# Patient Record
Sex: FEMALE | Race: OTHER | HISPANIC OR LATINO | URBAN - METROPOLITAN AREA
[De-identification: names, ages, dates, MRNs, and addresses within clinical notes are randomized per-mention and may not be internally consistent; named-entity substitution may affect disease eponyms.]

---

## 2018-02-26 ENCOUNTER — EMERGENCY (EMERGENCY)
Facility: HOSPITAL | Age: 73
LOS: 1 days | Discharge: HOME | End: 2018-02-26
Attending: EMERGENCY MEDICINE
Payer: MEDICARE

## 2018-02-26 VITALS
DIASTOLIC BLOOD PRESSURE: 59 MMHG | WEIGHT: 115.96 LBS | OXYGEN SATURATION: 100 % | HEART RATE: 80 BPM | SYSTOLIC BLOOD PRESSURE: 132 MMHG | HEIGHT: 62 IN | RESPIRATION RATE: 15 BRPM | TEMPERATURE: 98 F

## 2018-02-26 VITALS
HEART RATE: 80 BPM | RESPIRATION RATE: 18 BRPM | TEMPERATURE: 98 F | SYSTOLIC BLOOD PRESSURE: 119 MMHG | OXYGEN SATURATION: 97 % | DIASTOLIC BLOOD PRESSURE: 69 MMHG

## 2018-02-26 PROBLEM — Z00.00 ENCOUNTER FOR PREVENTIVE HEALTH EXAMINATION: Status: ACTIVE | Noted: 2018-02-26

## 2018-02-26 LAB
ALBUMIN SERPL ELPH-MCNC: 4.2 G/DL — SIGNIFICANT CHANGE UP (ref 3.3–5.2)
ALP SERPL-CCNC: 120 U/L — SIGNIFICANT CHANGE UP (ref 40–120)
ALT FLD-CCNC: 25 U/L — SIGNIFICANT CHANGE UP
ANION GAP SERPL CALC-SCNC: 17 MMOL/L — SIGNIFICANT CHANGE UP (ref 5–17)
APPEARANCE UR: CLEAR — SIGNIFICANT CHANGE UP
AST SERPL-CCNC: 23 U/L — SIGNIFICANT CHANGE UP
BASOPHILS # BLD AUTO: 0 K/UL — SIGNIFICANT CHANGE UP (ref 0–0.2)
BASOPHILS NFR BLD AUTO: 0.5 % — SIGNIFICANT CHANGE UP (ref 0–2)
BILIRUB SERPL-MCNC: 0.3 MG/DL — LOW (ref 0.4–2)
BILIRUB UR-MCNC: NEGATIVE — SIGNIFICANT CHANGE UP
BUN SERPL-MCNC: 13 MG/DL — SIGNIFICANT CHANGE UP (ref 8–20)
CALCIUM SERPL-MCNC: 9.2 MG/DL — SIGNIFICANT CHANGE UP (ref 8.6–10.2)
CHLORIDE SERPL-SCNC: 101 MMOL/L — SIGNIFICANT CHANGE UP (ref 98–107)
CO2 SERPL-SCNC: 21 MMOL/L — LOW (ref 22–29)
COLOR SPEC: YELLOW — SIGNIFICANT CHANGE UP
CREAT SERPL-MCNC: 0.86 MG/DL — SIGNIFICANT CHANGE UP (ref 0.5–1.3)
DIFF PNL FLD: NEGATIVE — SIGNIFICANT CHANGE UP
EOSINOPHIL # BLD AUTO: 0.1 K/UL — SIGNIFICANT CHANGE UP (ref 0–0.5)
EOSINOPHIL NFR BLD AUTO: 1.3 % — SIGNIFICANT CHANGE UP (ref 0–6)
GLUCOSE SERPL-MCNC: 107 MG/DL — SIGNIFICANT CHANGE UP (ref 70–115)
GLUCOSE UR QL: NEGATIVE MG/DL — SIGNIFICANT CHANGE UP
HCT VFR BLD CALC: 41.6 % — SIGNIFICANT CHANGE UP (ref 37–47)
HGB BLD-MCNC: 14 G/DL — SIGNIFICANT CHANGE UP (ref 12–16)
KETONES UR-MCNC: NEGATIVE — SIGNIFICANT CHANGE UP
LEUKOCYTE ESTERASE UR-ACNC: NEGATIVE — SIGNIFICANT CHANGE UP
LYMPHOCYTES # BLD AUTO: 1.6 K/UL — SIGNIFICANT CHANGE UP (ref 1–4.8)
LYMPHOCYTES # BLD AUTO: 25.8 % — SIGNIFICANT CHANGE UP (ref 20–55)
MCHC RBC-ENTMCNC: 28.3 PG — SIGNIFICANT CHANGE UP (ref 27–31)
MCHC RBC-ENTMCNC: 33.7 G/DL — SIGNIFICANT CHANGE UP (ref 32–36)
MCV RBC AUTO: 84 FL — SIGNIFICANT CHANGE UP (ref 81–99)
MONOCYTES # BLD AUTO: 0.3 K/UL — SIGNIFICANT CHANGE UP (ref 0–0.8)
MONOCYTES NFR BLD AUTO: 5.7 % — SIGNIFICANT CHANGE UP (ref 3–10)
NEUTROPHILS # BLD AUTO: 4 K/UL — SIGNIFICANT CHANGE UP (ref 1.8–8)
NEUTROPHILS NFR BLD AUTO: 66.5 % — SIGNIFICANT CHANGE UP (ref 37–73)
NITRITE UR-MCNC: NEGATIVE — SIGNIFICANT CHANGE UP
PH UR: 6 — SIGNIFICANT CHANGE UP (ref 5–8)
PLATELET # BLD AUTO: 354 K/UL — SIGNIFICANT CHANGE UP (ref 150–400)
POTASSIUM SERPL-MCNC: 3.8 MMOL/L — SIGNIFICANT CHANGE UP (ref 3.5–5.3)
POTASSIUM SERPL-SCNC: 3.8 MMOL/L — SIGNIFICANT CHANGE UP (ref 3.5–5.3)
PROT SERPL-MCNC: 7.5 G/DL — SIGNIFICANT CHANGE UP (ref 6.6–8.7)
PROT UR-MCNC: NEGATIVE MG/DL — SIGNIFICANT CHANGE UP
RBC # BLD: 4.95 M/UL — SIGNIFICANT CHANGE UP (ref 4.4–5.2)
RBC # FLD: 14.1 % — SIGNIFICANT CHANGE UP (ref 11–15.6)
SODIUM SERPL-SCNC: 139 MMOL/L — SIGNIFICANT CHANGE UP (ref 135–145)
SP GR SPEC: 1 — LOW (ref 1.01–1.02)
TROPONIN T SERPL-MCNC: <0.01 NG/ML — SIGNIFICANT CHANGE UP (ref 0–0.06)
TSH SERPL-MCNC: 2.23 UIU/ML — SIGNIFICANT CHANGE UP (ref 0.27–4.2)
UROBILINOGEN FLD QL: NEGATIVE MG/DL — SIGNIFICANT CHANGE UP
WBC # BLD: 6 K/UL — SIGNIFICANT CHANGE UP (ref 4.8–10.8)
WBC # FLD AUTO: 6 K/UL — SIGNIFICANT CHANGE UP (ref 4.8–10.8)

## 2018-02-26 PROCEDURE — 93010 ELECTROCARDIOGRAM REPORT: CPT

## 2018-02-26 PROCEDURE — 81003 URINALYSIS AUTO W/O SCOPE: CPT

## 2018-02-26 PROCEDURE — 99284 EMERGENCY DEPT VISIT MOD MDM: CPT | Mod: 25

## 2018-02-26 PROCEDURE — 84443 ASSAY THYROID STIM HORMONE: CPT

## 2018-02-26 PROCEDURE — 71045 X-RAY EXAM CHEST 1 VIEW: CPT | Mod: 26

## 2018-02-26 PROCEDURE — 85027 COMPLETE CBC AUTOMATED: CPT

## 2018-02-26 PROCEDURE — 80053 COMPREHEN METABOLIC PANEL: CPT

## 2018-02-26 PROCEDURE — 99285 EMERGENCY DEPT VISIT HI MDM: CPT

## 2018-02-26 PROCEDURE — 84484 ASSAY OF TROPONIN QUANT: CPT

## 2018-02-26 PROCEDURE — 93005 ELECTROCARDIOGRAM TRACING: CPT

## 2018-02-26 PROCEDURE — 36415 COLL VENOUS BLD VENIPUNCTURE: CPT

## 2018-02-26 PROCEDURE — 71045 X-RAY EXAM CHEST 1 VIEW: CPT

## 2018-02-26 RX ORDER — ASPIRIN/CALCIUM CARB/MAGNESIUM 324 MG
325 TABLET ORAL ONCE
Qty: 0 | Refills: 0 | Status: COMPLETED | OUTPATIENT
Start: 2018-02-26 | End: 2018-02-26

## 2018-02-26 RX ORDER — MECLIZINE HCL 12.5 MG
1 TABLET ORAL
Qty: 21 | Refills: 0 | OUTPATIENT
Start: 2018-02-26 | End: 2018-03-04

## 2018-02-26 RX ORDER — SODIUM CHLORIDE 9 MG/ML
1000 INJECTION INTRAMUSCULAR; INTRAVENOUS; SUBCUTANEOUS ONCE
Qty: 0 | Refills: 0 | Status: DISCONTINUED | OUTPATIENT
Start: 2018-02-26 | End: 2018-03-02

## 2018-02-26 RX ORDER — ATORVASTATIN CALCIUM 80 MG/1
1 TABLET, FILM COATED ORAL
Qty: 0 | Refills: 0 | COMMUNITY

## 2018-02-26 RX ORDER — MECLIZINE HCL 12.5 MG
25 TABLET ORAL ONCE
Qty: 0 | Refills: 0 | Status: DISCONTINUED | OUTPATIENT
Start: 2018-02-26 | End: 2018-03-02

## 2018-02-26 NOTE — ED STATDOCS - PROGRESS NOTE DETAILS
73 y/o F w/ PMHx of HLD presents to ED c/o dizziness and fatigue. Associated sx include nausea, vomiting and intermittent CP with episodes lasting a few minutes. She reports CP resolved last week but has returned today around 07:30. Pt went to [facility] 02/01/2018 where she began experiencing urinary frequency and burning urination. She returned home and visited her PMD where she was given Dr. Pitts who noticed microscopic blood in urine and is treating her with Bactrim (started on 02/17/18). Pt tested negative for the flu but has sx. She had a dizzy spell yesterday which resulted in near syncope. Daily medications include Lipitor 10mg. Denies SOB or any other complaints at this time. Protocol orders have been entered following a focused evaluation in intake. Pt to be placed in the main ED for further evaluation by another provider. 71 y/o F w/ PMHx of HLD presents to ED c/o dizziness and fatigue. Associated sx include nausea, vomiting and intermittent CP with episodes lasting a few minutes. She reports CP epiosde last week and again today around 07:30. She had a dizzy spell yesterday and today assoc with ataxia. Pt went to [facility] 02/01/2018 where she began experiencing urinary frequency and burning urination. She returned home and visited her PMD where she was given Dr. Pitts who noticed microscopic blood in urine and is treating her with Bactrim (started on 02/17/18). Pt tested negative for the flu but has sx. Daily medications include Lipitor 10mg. Denies SOB or any other complaints at this time. Protocol orders have been entered following a focused evaluation in intake. Pt to be placed in the main ED for further evaluation by another provider.

## 2018-02-26 NOTE — ED ADULT NURSE REASSESSMENT NOTE - NS ED NURSE REASSESS COMMENT FT1
pt was in [facility] beginning of Feb 'I was sick, they gave me an ABT"  "and I came back and now I am still sick"   pt went to the MDs office and sent to have tests done.  'I need to have blood work and a sono of my kidneys and a chest xray"   labs sent pt ambulated to bathroom with cup

## 2019-07-11 ENCOUNTER — RESULT REVIEW (OUTPATIENT)
Age: 74
End: 2019-07-11

## 2019-10-08 PROBLEM — E78.00 PURE HYPERCHOLESTEROLEMIA, UNSPECIFIED: Chronic | Status: ACTIVE | Noted: 2018-02-26

## 2019-10-25 ENCOUNTER — EMERGENCY (EMERGENCY)
Facility: HOSPITAL | Age: 74
LOS: 1 days | Discharge: HOME | End: 2019-10-25
Attending: EMERGENCY MEDICINE
Payer: COMMERCIAL

## 2019-10-25 VITALS
WEIGHT: 110.01 LBS | DIASTOLIC BLOOD PRESSURE: 72 MMHG | RESPIRATION RATE: 16 BRPM | OXYGEN SATURATION: 100 % | SYSTOLIC BLOOD PRESSURE: 168 MMHG | HEART RATE: 70 BPM | TEMPERATURE: 98 F | HEIGHT: 62 IN

## 2019-10-25 PROCEDURE — 99284 EMERGENCY DEPT VISIT MOD MDM: CPT

## 2019-10-25 PROCEDURE — 71101 X-RAY EXAM UNILAT RIBS/CHEST: CPT

## 2019-10-25 PROCEDURE — 99283 EMERGENCY DEPT VISIT LOW MDM: CPT

## 2019-10-25 PROCEDURE — 71101 X-RAY EXAM UNILAT RIBS/CHEST: CPT | Mod: 26

## 2019-10-25 PROCEDURE — 73610 X-RAY EXAM OF ANKLE: CPT | Mod: 26,LT

## 2019-10-25 PROCEDURE — 73610 X-RAY EXAM OF ANKLE: CPT

## 2019-10-25 PROCEDURE — T1013: CPT

## 2019-10-25 NOTE — ED STATDOCS - NS ED ROS FT
Constitutional: no fevers, chills  HEENT: no visual changes, no sore throat, no rhinorrhea  CV: no cp  Resp: no sob  GI: no abd pain, n/v, diarrhea/constipation  : no dysuria, hematuria  MSK left sided rib and ankle pain  skin: no rashes  neuro: no HA, no confusion  psych: no SI/HI

## 2019-10-25 NOTE — ED STATDOCS - CLINICAL SUMMARY MEDICAL DECISION MAKING FREE TEXT BOX
Pt s/p mechanical fall, will obtain Xray and reassess. Has already taken pain medication. Pt s/p mechanical fall, will obtain Xray and reassess. Has already taken pain medication. Gave pt an ice pack

## 2019-10-25 NOTE — ED ADULT TRIAGE NOTE - CHIEF COMPLAINT QUOTE
Pt s/p trip and fall outside this morning while walking her grandson to the bus stop. Pt c/o pain to the left side of ribs, left leg and foot. +swelling and abrasion to left foot.,

## 2019-10-25 NOTE — ED STATDOCS - PATIENT PORTAL LINK FT
You can access the FollowMyHealth Patient Portal offered by [facility] by registering at the following website: [email or web address]. By joining [employer]’s FollowMyHealth portal, you will also be able to view your health information using other applications (apps) compatible with our system.

## 2019-10-25 NOTE — ED STATDOCS - PROGRESS NOTE DETAILS
73 year old female with PMHx arthritis and HLD presents to the ED s/p twisting L ankle this morning. HPI/ ROS/ PEx as stated above. X ray ankle and L ribs ordered. Will reassess. x ray ankle and rib neg for fx. Will dc with air cast, pt requesting cane instead of crutches. Will d/c with ortho f/u.

## 2019-10-25 NOTE — ED STATDOCS - OBJECTIVE STATEMENT
74y/o F with PMHx of Hyperlipidemia and Arthritis presents to the ED s/p mechanical trip and fall c/o left sided rib and ankle pain. Denies head trauma or LOC. Pt took Tylenol and applied ice to area PTA. Pt denies use of blood thinners. Denies CP, SOB, N/V. No additional complaints at this time.   : Kate

## 2019-10-25 NOTE — ED STATDOCS - PHYSICAL EXAMINATION
Gen: Well appearing in NAD  Head: NC/AT  Cardiac: Reg rate and rhythm   Resp: CTA   Neck: trachea midline  Resp:  No distress  Ext: TTP left medial malleolus and left mid food. Sensation and pulses intact. TTP left anterior lower ribs. No midline spinal/paraspinal tenderness. Full ROM of neck.   Neuro:  A&O appears non focal  Skin:  Warm and dry as visualized  Psych:  Normal affect and mood Gen: Well appearing in NAD  Head: NC/AT  Cardiac: Reg rate and rhythm   Resp: CTA   Neck: trachea midline  Resp:  No distress  Ext: TTP left medial malleolus and left mid foot. Sensation and pulses intact. TTP left anterior lower ribs. No midline spinal/paraspinal tenderness. Full ROM of neck.   Neuro:  A&O appears non focal  Skin:  Warm and dry as visualized  Psych:  Normal affect and mood Gen: Well appearing in NAD  Head: NC/AT  Cardiac: Reg rate and rhythm   Resp: CTA   Neck: trachea midline  Ext: TTP left medial malleolus and left mid foot. Sensation and pulses intact. TTP left anterior lower ribs. No midline spinal/paraspinal tenderness. Full ROM of neck.   Neuro:  A&O appears non focal  Skin:  Warm and dry as visualized  Psych:  Normal affect and mood

## 2019-11-16 ENCOUNTER — APPOINTMENT (OUTPATIENT)
Dept: OBGYN | Facility: CLINIC | Age: 74
End: 2019-11-16
Payer: MEDICARE

## 2019-11-16 VITALS
BODY MASS INDEX: 19.88 KG/M2 | HEIGHT: 62 IN | WEIGHT: 108 LBS | SYSTOLIC BLOOD PRESSURE: 120 MMHG | DIASTOLIC BLOOD PRESSURE: 60 MMHG

## 2019-11-16 DIAGNOSIS — Z80.0 FAMILY HISTORY OF MALIGNANT NEOPLASM OF DIGESTIVE ORGANS: ICD-10-CM

## 2019-11-16 DIAGNOSIS — Z01.419 ENCOUNTER FOR GYNECOLOGICAL EXAMINATION (GENERAL) (ROUTINE) W/OUT ABNORMAL FINDINGS: ICD-10-CM

## 2019-11-16 DIAGNOSIS — Z78.9 OTHER SPECIFIED HEALTH STATUS: ICD-10-CM

## 2019-11-16 DIAGNOSIS — R92.2 INCONCLUSIVE MAMMOGRAM: ICD-10-CM

## 2019-11-16 DIAGNOSIS — Z63.4 DISAPPEARANCE AND DEATH OF FAMILY MEMBER: ICD-10-CM

## 2019-11-16 DIAGNOSIS — Z80.3 FAMILY HISTORY OF MALIGNANT NEOPLASM OF BREAST: ICD-10-CM

## 2019-11-16 PROCEDURE — 99387 INIT PM E/M NEW PAT 65+ YRS: CPT

## 2019-11-16 PROCEDURE — G0439: CPT

## 2019-11-16 SDOH — SOCIAL STABILITY - SOCIAL INSECURITY: DISSAPEARANCE AND DEATH OF FAMILY MEMBER: Z63.4

## 2019-11-18 PROBLEM — Z80.3 FAMILY HISTORY OF MALIGNANT NEOPLASM OF BREAST: Status: ACTIVE | Noted: 2019-11-16

## 2019-11-18 PROBLEM — Z80.0 FAMILY HISTORY OF MALIGNANT NEOPLASM OF COLON: Status: ACTIVE | Noted: 2019-11-16

## 2019-11-18 PROBLEM — Z63.4 WIDOWED: Status: ACTIVE | Noted: 2019-11-16

## 2019-11-18 PROBLEM — Z78.9 EXERCISES OCCASIONALLY: Status: ACTIVE | Noted: 2019-11-16

## 2019-11-18 RX ORDER — ATORVASTATIN CALCIUM 10 MG/1
10 TABLET, FILM COATED ORAL
Qty: 90 | Refills: 0 | Status: ACTIVE | COMMUNITY
Start: 2019-09-16

## 2019-11-18 NOTE — HISTORY OF PRESENT ILLNESS
[1 Year Ago] : 1 year ago [Last Mammogram ___] : Last Mammogram was [unfilled] [Reproductive Age] : is of reproductive age [unknown] : the patient is unsure of the date of her LMP [Post-Menopause, No Sxs] : post-menopausal, currently without symptoms [Sexually Active] : is not sexually active

## 2019-11-18 NOTE — PHYSICAL EXAM
[Awake] : awake [Alert] : alert [Acute Distress] : no acute distress [Mass] : no breast mass [Nipple Discharge] : no nipple discharge [Axillary LAD] : no axillary lymphadenopathy [Soft] : soft [Tender] : non tender [Oriented x3] : oriented to person, place, and time [Normal] : uterus [Atrophy] : atrophy [No Bleeding] : there was no active vaginal bleeding [Uterine Adnexae] : were not tender and not enlarged [FreeTextEntry4] : marked vaginal atrophy noted

## 2019-11-20 LAB — HPV HIGH+LOW RISK DNA PNL CVX: NOT DETECTED

## 2019-11-23 LAB — CYTOLOGY CVX/VAG DOC THIN PREP: ABNORMAL

## 2020-12-21 PROBLEM — Z01.419 ENCOUNTER FOR GYNECOLOGICAL EXAMINATION WITHOUT ABNORMAL FINDING: Status: RESOLVED | Noted: 2019-11-16 | Resolved: 2020-12-21

## 2021-10-28 NOTE — ED ADULT TRIAGE NOTE - NS ED TRIAGE AVPU SCALE
Alert-The patient is alert, awake and responds to voice. The patient is oriented to time, place, and person. The triage nurse is able to obtain subjective information. PAST SURGICAL HISTORY:  No significant past surgical history

## 2023-01-27 NOTE — ED PROVIDER NOTE - OBJECTIVE STATEMENT
pt comes in c/o dizziness , lightheaded   states has been on abx x two for UTI    denies urinary symptoms , no fever   but now having dizziness Alert-The patient is alert, awake and responds to voice. The patient is oriented to time, place, and person. The triage nurse is able to obtain subjective information.

## 2024-03-25 NOTE — ED ADULT TRIAGE NOTE - NS ED TRIAGE AVPU SCALE
Pt. Mother called back, asking to speak with JANE Unger IBD, pt. Mother called [employer], delivery scheduled for tomorrow for Skyrizi, warm transfer initiated with JANE Unger    Alert-The patient is alert, awake and responds to voice. The patient is oriented to time, place, and person. The triage nurse is able to obtain subjective information.

## 2024-04-08 ENCOUNTER — NON-APPOINTMENT (OUTPATIENT)
Age: 79
End: 2024-04-08

## 2024-04-09 ENCOUNTER — EMERGENCY (EMERGENCY)
Facility: HOSPITAL | Age: 79
LOS: 1 days | Discharge: HOME | End: 2024-04-09
Attending: EMERGENCY MEDICINE
Payer: MEDICARE

## 2024-04-09 VITALS
HEART RATE: 94 BPM | TEMPERATURE: 98 F | OXYGEN SATURATION: 98 % | WEIGHT: 114.2 LBS | DIASTOLIC BLOOD PRESSURE: 72 MMHG | RESPIRATION RATE: 20 BRPM | SYSTOLIC BLOOD PRESSURE: 149 MMHG

## 2024-04-09 PROCEDURE — 99053 MED SERV 10PM-8AM 24 HR FAC: CPT

## 2024-04-09 PROCEDURE — 99285 EMERGENCY DEPT VISIT HI MDM: CPT

## 2024-04-10 VITALS
SYSTOLIC BLOOD PRESSURE: 119 MMHG | RESPIRATION RATE: 18 BRPM | TEMPERATURE: 98 F | HEART RATE: 84 BPM | DIASTOLIC BLOOD PRESSURE: 62 MMHG | OXYGEN SATURATION: 99 %

## 2024-04-10 LAB
ALBUMIN SERPL ELPH-MCNC: 3.7 G/DL — SIGNIFICANT CHANGE UP (ref 3.3–5.2)
ALP SERPL-CCNC: 113 U/L — SIGNIFICANT CHANGE UP (ref 40–120)
ALT FLD-CCNC: 20 U/L — SIGNIFICANT CHANGE UP
ANION GAP SERPL CALC-SCNC: 13 MMOL/L — SIGNIFICANT CHANGE UP (ref 5–17)
APPEARANCE UR: CLEAR — SIGNIFICANT CHANGE UP
AST SERPL-CCNC: 21 U/L — SIGNIFICANT CHANGE UP
BASE EXCESS BLDV CALC-SCNC: -3.5 MMOL/L — LOW (ref -2–3)
BASOPHILS # BLD AUTO: 0.01 K/UL — SIGNIFICANT CHANGE UP (ref 0–0.2)
BASOPHILS NFR BLD AUTO: 0.2 % — SIGNIFICANT CHANGE UP (ref 0–2)
BILIRUB SERPL-MCNC: 0.7 MG/DL — SIGNIFICANT CHANGE UP (ref 0.4–2)
BILIRUB UR-MCNC: NEGATIVE — SIGNIFICANT CHANGE UP
BUN SERPL-MCNC: 17.9 MG/DL — SIGNIFICANT CHANGE UP (ref 8–20)
CA-I SERPL-SCNC: 1.09 MMOL/L — LOW (ref 1.15–1.33)
CALCIUM SERPL-MCNC: 8.2 MG/DL — LOW (ref 8.4–10.5)
CHLORIDE BLDV-SCNC: 104 MMOL/L — SIGNIFICANT CHANGE UP (ref 96–108)
CHLORIDE SERPL-SCNC: 103 MMOL/L — SIGNIFICANT CHANGE UP (ref 96–108)
CO2 SERPL-SCNC: 21 MMOL/L — LOW (ref 22–29)
COLOR SPEC: YELLOW — SIGNIFICANT CHANGE UP
CREAT SERPL-MCNC: 0.61 MG/DL — SIGNIFICANT CHANGE UP (ref 0.5–1.3)
DIFF PNL FLD: NEGATIVE — SIGNIFICANT CHANGE UP
EGFR: 91 ML/MIN/1.73M2 — SIGNIFICANT CHANGE UP
EOSINOPHIL # BLD AUTO: 0.14 K/UL — SIGNIFICANT CHANGE UP (ref 0–0.5)
EOSINOPHIL NFR BLD AUTO: 2.4 % — SIGNIFICANT CHANGE UP (ref 0–6)
GAS PNL BLDV: 135 MMOL/L — LOW (ref 136–145)
GAS PNL BLDV: SIGNIFICANT CHANGE UP
GAS PNL BLDV: SIGNIFICANT CHANGE UP
GLUCOSE BLDV-MCNC: 83 MG/DL — SIGNIFICANT CHANGE UP (ref 70–99)
GLUCOSE SERPL-MCNC: 82 MG/DL — SIGNIFICANT CHANGE UP (ref 70–99)
GLUCOSE UR QL: NEGATIVE MG/DL — SIGNIFICANT CHANGE UP
HCO3 BLDV-SCNC: 22 MMOL/L — SIGNIFICANT CHANGE UP (ref 22–29)
HCT VFR BLD CALC: 38.1 % — SIGNIFICANT CHANGE UP (ref 34.5–45)
HCT VFR BLDA CALC: 40 % — SIGNIFICANT CHANGE UP
HGB BLD CALC-MCNC: 13.3 G/DL — SIGNIFICANT CHANGE UP (ref 11.7–16.1)
HGB BLD-MCNC: 12.9 G/DL — SIGNIFICANT CHANGE UP (ref 11.5–15.5)
IMM GRANULOCYTES NFR BLD AUTO: 0.2 % — SIGNIFICANT CHANGE UP (ref 0–0.9)
KETONES UR-MCNC: 40 MG/DL
LACTATE BLDV-MCNC: 0.9 MMOL/L — SIGNIFICANT CHANGE UP (ref 0.5–2)
LEUKOCYTE ESTERASE UR-ACNC: NEGATIVE — SIGNIFICANT CHANGE UP
LIDOCAIN IGE QN: 17 U/L — LOW (ref 22–51)
LYMPHOCYTES # BLD AUTO: 0.73 K/UL — LOW (ref 1–3.3)
LYMPHOCYTES # BLD AUTO: 12.4 % — LOW (ref 13–44)
MCHC RBC-ENTMCNC: 28.8 PG — SIGNIFICANT CHANGE UP (ref 27–34)
MCHC RBC-ENTMCNC: 33.9 GM/DL — SIGNIFICANT CHANGE UP (ref 32–36)
MCV RBC AUTO: 85 FL — SIGNIFICANT CHANGE UP (ref 80–100)
MONOCYTES # BLD AUTO: 0.31 K/UL — SIGNIFICANT CHANGE UP (ref 0–0.9)
MONOCYTES NFR BLD AUTO: 5.3 % — SIGNIFICANT CHANGE UP (ref 2–14)
NEUTROPHILS # BLD AUTO: 4.67 K/UL — SIGNIFICANT CHANGE UP (ref 1.8–7.4)
NEUTROPHILS NFR BLD AUTO: 79.5 % — HIGH (ref 43–77)
NITRITE UR-MCNC: NEGATIVE — SIGNIFICANT CHANGE UP
PCO2 BLDV: 40 MMHG — SIGNIFICANT CHANGE UP (ref 39–42)
PH BLDV: 7.35 — SIGNIFICANT CHANGE UP (ref 7.32–7.43)
PH UR: 5.5 — SIGNIFICANT CHANGE UP (ref 5–8)
PLATELET # BLD AUTO: 241 K/UL — SIGNIFICANT CHANGE UP (ref 150–400)
PO2 BLDV: 81 MMHG — HIGH (ref 25–45)
POTASSIUM BLDV-SCNC: 3.4 MMOL/L — LOW (ref 3.5–5.1)
POTASSIUM SERPL-MCNC: 3.6 MMOL/L — SIGNIFICANT CHANGE UP (ref 3.5–5.3)
POTASSIUM SERPL-SCNC: 3.6 MMOL/L — SIGNIFICANT CHANGE UP (ref 3.5–5.3)
PROT SERPL-MCNC: 6.5 G/DL — LOW (ref 6.6–8.7)
PROT UR-MCNC: NEGATIVE MG/DL — SIGNIFICANT CHANGE UP
RBC # BLD: 4.48 M/UL — SIGNIFICANT CHANGE UP (ref 3.8–5.2)
RBC # FLD: 13.7 % — SIGNIFICANT CHANGE UP (ref 10.3–14.5)
SAO2 % BLDV: 99 % — SIGNIFICANT CHANGE UP
SODIUM SERPL-SCNC: 137 MMOL/L — SIGNIFICANT CHANGE UP (ref 135–145)
SP GR SPEC: 1.01 — SIGNIFICANT CHANGE UP (ref 1–1.03)
UROBILINOGEN FLD QL: 0.2 MG/DL — SIGNIFICANT CHANGE UP (ref 0.2–1)
WBC # BLD: 5.87 K/UL — SIGNIFICANT CHANGE UP (ref 3.8–10.5)
WBC # FLD AUTO: 5.87 K/UL — SIGNIFICANT CHANGE UP (ref 3.8–10.5)

## 2024-04-10 PROCEDURE — 81003 URINALYSIS AUTO W/O SCOPE: CPT

## 2024-04-10 PROCEDURE — 84132 ASSAY OF SERUM POTASSIUM: CPT

## 2024-04-10 PROCEDURE — 85025 COMPLETE CBC W/AUTO DIFF WBC: CPT

## 2024-04-10 PROCEDURE — 85014 HEMATOCRIT: CPT

## 2024-04-10 PROCEDURE — 96374 THER/PROPH/DIAG INJ IV PUSH: CPT | Mod: XU

## 2024-04-10 PROCEDURE — 96375 TX/PRO/DX INJ NEW DRUG ADDON: CPT

## 2024-04-10 PROCEDURE — 82435 ASSAY OF BLOOD CHLORIDE: CPT

## 2024-04-10 PROCEDURE — 74177 CT ABD & PELVIS W/CONTRAST: CPT | Mod: MC

## 2024-04-10 PROCEDURE — 84295 ASSAY OF SERUM SODIUM: CPT

## 2024-04-10 PROCEDURE — 93010 ELECTROCARDIOGRAM REPORT: CPT

## 2024-04-10 PROCEDURE — 99285 EMERGENCY DEPT VISIT HI MDM: CPT | Mod: 25

## 2024-04-10 PROCEDURE — 82947 ASSAY GLUCOSE BLOOD QUANT: CPT

## 2024-04-10 PROCEDURE — 83690 ASSAY OF LIPASE: CPT

## 2024-04-10 PROCEDURE — 85018 HEMOGLOBIN: CPT

## 2024-04-10 PROCEDURE — 36415 COLL VENOUS BLD VENIPUNCTURE: CPT

## 2024-04-10 PROCEDURE — 74177 CT ABD & PELVIS W/CONTRAST: CPT | Mod: 26,MC

## 2024-04-10 PROCEDURE — 93005 ELECTROCARDIOGRAM TRACING: CPT

## 2024-04-10 PROCEDURE — 80053 COMPREHEN METABOLIC PANEL: CPT

## 2024-04-10 PROCEDURE — 82330 ASSAY OF CALCIUM: CPT

## 2024-04-10 PROCEDURE — 87086 URINE CULTURE/COLONY COUNT: CPT

## 2024-04-10 PROCEDURE — 82803 BLOOD GASES ANY COMBINATION: CPT

## 2024-04-10 PROCEDURE — 83605 ASSAY OF LACTIC ACID: CPT

## 2024-04-10 RX ORDER — METRONIDAZOLE 500 MG
1 TABLET ORAL
Qty: 10 | Refills: 0
Start: 2024-04-10 | End: 2024-04-14

## 2024-04-10 RX ORDER — METOCLOPRAMIDE HCL 10 MG
10 TABLET ORAL ONCE
Refills: 0 | Status: COMPLETED | OUTPATIENT
Start: 2024-04-10 | End: 2024-04-10

## 2024-04-10 RX ORDER — ONDANSETRON 8 MG/1
4 TABLET, FILM COATED ORAL ONCE
Refills: 0 | Status: COMPLETED | OUTPATIENT
Start: 2024-04-10 | End: 2024-04-10

## 2024-04-10 RX ORDER — METRONIDAZOLE 500 MG
500 TABLET ORAL ONCE
Refills: 0 | Status: COMPLETED | OUTPATIENT
Start: 2024-04-10 | End: 2024-04-10

## 2024-04-10 RX ORDER — SODIUM CHLORIDE 9 MG/ML
1000 INJECTION INTRAMUSCULAR; INTRAVENOUS; SUBCUTANEOUS ONCE
Refills: 0 | Status: COMPLETED | OUTPATIENT
Start: 2024-04-10 | End: 2024-04-10

## 2024-04-10 RX ORDER — CIPROFLOXACIN LACTATE 400MG/40ML
400 VIAL (ML) INTRAVENOUS ONCE
Refills: 0 | Status: DISCONTINUED | OUTPATIENT
Start: 2024-04-10 | End: 2024-04-10

## 2024-04-10 RX ORDER — ACETAMINOPHEN 500 MG
1000 TABLET ORAL ONCE
Refills: 0 | Status: COMPLETED | OUTPATIENT
Start: 2024-04-10 | End: 2024-04-10

## 2024-04-10 RX ORDER — CIPROFLOXACIN LACTATE 400MG/40ML
500 VIAL (ML) INTRAVENOUS ONCE
Refills: 0 | Status: COMPLETED | OUTPATIENT
Start: 2024-04-10 | End: 2024-04-10

## 2024-04-10 RX ORDER — METOCLOPRAMIDE HCL 10 MG
1 TABLET ORAL
Qty: 15 | Refills: 0
Start: 2024-04-10 | End: 2024-04-14

## 2024-04-10 RX ORDER — MORPHINE SULFATE 50 MG/1
4 CAPSULE, EXTENDED RELEASE ORAL ONCE
Refills: 0 | Status: DISCONTINUED | OUTPATIENT
Start: 2024-04-10 | End: 2024-04-10

## 2024-04-10 RX ORDER — CIPROFLOXACIN LACTATE 400MG/40ML
1 VIAL (ML) INTRAVENOUS
Qty: 15 | Refills: 0
Start: 2024-04-10 | End: 2024-04-14

## 2024-04-10 RX ADMIN — Medication 500 MILLIGRAM(S): at 06:29

## 2024-04-10 RX ADMIN — SODIUM CHLORIDE 1000 MILLILITER(S): 9 INJECTION INTRAMUSCULAR; INTRAVENOUS; SUBCUTANEOUS at 01:07

## 2024-04-10 RX ADMIN — MORPHINE SULFATE 4 MILLIGRAM(S): 50 CAPSULE, EXTENDED RELEASE ORAL at 01:09

## 2024-04-10 RX ADMIN — Medication 100 MILLIGRAM(S): at 05:48

## 2024-04-10 RX ADMIN — ONDANSETRON 4 MILLIGRAM(S): 8 TABLET, FILM COATED ORAL at 01:09

## 2024-04-10 RX ADMIN — Medication 400 MILLIGRAM(S): at 06:29

## 2024-04-10 RX ADMIN — Medication 10 MILLIGRAM(S): at 03:37

## 2024-04-10 NOTE — ED PROVIDER NOTE - PHYSICAL EXAMINATION
Gen: no acute distress  Head: normocephalic, atraumatic  EENT: EOMI  Lung: no increased work of breathing, CTABL,  CV: normal s1/s2, rrr, 2+ radial pulses b/l, no LE edema  Abd: soft, TTP LLQ; no rebound tenderness or guarding; non-distended; no CVA tenderness  MSK: no visible deformities, full range of motion in all 4 extremities  Neuro: A&Ox4; No focal neurologic deficits

## 2024-04-10 NOTE — ED PROVIDER NOTE - OBJECTIVE STATEMENT
78-year-old female with PMH arthritis, HLD presenting with  with abdominal pain.  Patient endorses 1 day of left-sided abdominal pain without radiation associated with nausea multiple episodes of nonbloody diarrhea.  She denies episodes of vomiting.  Last bowel movement was about an hour ago.  She denies any recent sick contacts, travel, antibiotic use.  She went to urgent care for symptoms and was redirected to the emergency department.  Denies prior history of diverticulitis.  Denies blood in stool, melena, dysuria, hematuria, chest pain, shortness of breath, fevers.  Only abdominal surgical history includes cholecystectomy, .  Last colonoscopy was about 7 years ago was reportedly unremarkable.

## 2024-04-10 NOTE — ED PROVIDER NOTE - ATTENDING CONTRIBUTION TO CARE
[place]: I performed a face to face bedside interview with patient regarding history of present illness, review of symptoms and past medical history. I completed an independent physical exam.  I have discussed patient's plan of care with resident.   I agree with note as stated above including HISTORY OF PRESENT ILLNESS, HIV, PAST MEDICAL/SURGICAL/FAMILY/SOCIAL HISTORY, ALLERGIES AND HOME MEDICATIONS, REVIEW OF SYSTEMS, PHYSICAL EXAM, MEDICAL DECISION MAKING and any PROGRESS NOTES during the time I functioned as the attending physician for this patient unless otherwise noted. My brief assessment is as follows: 78-year-old female with PMH arthritis, HLD presenting with abdominal pain & NBNB emesis. Patient is overall nontoxic-appearing with stable vitals.  She is afebrile in the emergency department.  Physical exam significant for left lower quadrant tenderness without distention or signs of peritonitis.  will check CBC, CMP, lipase, urinalysis and sent for CT abdomen pelvis to evaluate for pancreatitis, diverticulitis, UTI, other acute intra-abdominal pathology.

## 2024-04-10 NOTE — ED PROVIDER NOTE - NSFOLLOWUPINSTRUCTIONS_ED_ALL_ED_FT
- Antibiotics have been sent to your pharmacy. Please take as prescribed until you finish the medication.  - Medication for nausea has been sent your pharmacy. Please take as prescribed.   - Please follow-up with your gastroenterologist in the next 2 weeks.   - SEEK IMMEDIATE MEDICAL CARE IF YOU HAVE ANY OF THE FOLLOWING SYMPTOMS: worsening abdominal pain, high fever, inability to hold down liquids or medication, black or bloody stools, inability to pass gas, lightheadedness/dizziness, or a change in mental status.    Diverticulitis    Diverticulitis is inflammation or infection of small pouches in your colon that form when you HAVE a condition called diverticulosis. This condition can range from mild to severe potentially leading to perforation or obstructions of your colon. Symptoms include abdominal pain, fever/chills, nausea, vomiting, diarrhea, constipation, or blood in your stool. If you were prescribed an antibiotic medicine, take it as told by your health care provider. Do not stop taking the antibiotic even if you start to feel better.

## 2024-04-10 NOTE — ED ADULT NURSE NOTE - OBJECTIVE STATEMENT
78-year-old female with PMH arthritis, HLD presenting with abdominal pain.  Patient endorses 1 day of left-sided abdominal pain without radiation associated with nausea multiple episodes of nonbloody diarrhea.  She denies episodes of vomiting.  Last bowel movement was about an hour ago.  She denies any recent sick contacts, travel, antibiotic use.  She went to urgent care for symptoms and was redirected to the emergency department.  Seen and evaluated by provider, orders obtained and noted.  IV placed, blood specimens obtained and sent to lab.  Medicated as ordered.

## 2024-04-10 NOTE — ED PROVIDER NOTE - PROGRESS NOTE DETAILS
Patient with diverticulitis on CT abd/pel. Will send antibiotics to pharmacy and PRN reglan. Patient reports that she has a preferred GI doctor she can follow-up with. Will discharge with strict return precautions. Hannah Alexander MD PGY-3

## 2024-04-10 NOTE — ED PROVIDER NOTE - PATIENT PORTAL LINK FT
You can access the FollowMyHealth Patient Portal offered by [facility] by registering at the following website: [email or web address]. By joining [employer]’s FollowMyHealth portal, you will also be able to view your health information using other applications (apps) compatible with our system.

## 2024-04-10 NOTE — ED PROVIDER NOTE - CLINICAL SUMMARY MEDICAL DECISION MAKING FREE TEXT BOX
78-year-old female with PMH arthritis, HLD presenting with abdominal pain.   Patient is overall nontoxic-appearing with stable vitals.  She is afebrile in the emergency department.  Physical exam significant for left lower quadrant tenderness without distention or signs of peritonitis.  will check CBC, CMP, lipase, urinalysis and sent for CT abdomen pelvis to evaluate for pancreatitis, diverticulitis, UTI, other acute intra-abdominal pathology. 78-year-old female with PMH arthritis, HLD presenting with abdominal pain & NBNB emesis. Patient is overall nontoxic-appearing with stable vitals.  She is afebrile in the emergency department.  Physical exam significant for left lower quadrant tenderness without distention or signs of peritonitis.  will check CBC, CMP, lipase, urinalysis and sent for CT abdomen pelvis to evaluate for pancreatitis, diverticulitis, UTI, other acute intra-abdominal pathology.

## 2024-04-11 LAB
CULTURE RESULTS: NO GROWTH — SIGNIFICANT CHANGE UP
SPECIMEN SOURCE: SIGNIFICANT CHANGE UP

## 2024-04-16 DIAGNOSIS — R10.32 LEFT LOWER QUADRANT PAIN: ICD-10-CM

## 2024-04-16 DIAGNOSIS — E78.5 HYPERLIPIDEMIA, UNSPECIFIED: ICD-10-CM

## 2024-04-16 DIAGNOSIS — K57.32 DIVERTICULITIS OF LARGE INTESTINE WITHOUT PERFORATION OR ABSCESS WITHOUT BLEEDING: ICD-10-CM

## 2024-07-13 ENCOUNTER — NON-APPOINTMENT (OUTPATIENT)
Age: 79
End: 2024-07-13

## 2024-07-14 ENCOUNTER — EMERGENCY (EMERGENCY)
Facility: HOSPITAL | Age: 79
LOS: 1 days | Discharge: HOME | End: 2024-07-14
Attending: EMERGENCY MEDICINE
Payer: MEDICARE

## 2024-07-14 VITALS
HEART RATE: 72 BPM | DIASTOLIC BLOOD PRESSURE: 75 MMHG | OXYGEN SATURATION: 99 % | WEIGHT: 115.08 LBS | HEIGHT: 60 IN | RESPIRATION RATE: 16 BRPM | TEMPERATURE: 98 F | SYSTOLIC BLOOD PRESSURE: 199 MMHG

## 2024-07-14 LAB
ALBUMIN SERPL ELPH-MCNC: 4 G/DL — SIGNIFICANT CHANGE UP (ref 3.3–5.2)
ALP SERPL-CCNC: 108 U/L — SIGNIFICANT CHANGE UP (ref 40–120)
ALT FLD-CCNC: 26 U/L — SIGNIFICANT CHANGE UP
ANION GAP SERPL CALC-SCNC: 10 MMOL/L — SIGNIFICANT CHANGE UP (ref 5–17)
AST SERPL-CCNC: 24 U/L — SIGNIFICANT CHANGE UP
BASOPHILS # BLD AUTO: 0.03 K/UL — SIGNIFICANT CHANGE UP (ref 0–0.2)
BASOPHILS NFR BLD AUTO: 0.6 % — SIGNIFICANT CHANGE UP (ref 0–2)
BILIRUB SERPL-MCNC: 0.3 MG/DL — LOW (ref 0.4–2)
BUN SERPL-MCNC: 17.5 MG/DL — SIGNIFICANT CHANGE UP (ref 8–20)
CALCIUM SERPL-MCNC: 9.2 MG/DL — SIGNIFICANT CHANGE UP (ref 8.4–10.5)
CHLORIDE SERPL-SCNC: 105 MMOL/L — SIGNIFICANT CHANGE UP (ref 96–108)
CO2 SERPL-SCNC: 24 MMOL/L — SIGNIFICANT CHANGE UP (ref 22–29)
CREAT SERPL-MCNC: 0.47 MG/DL — LOW (ref 0.5–1.3)
EGFR: 97 ML/MIN/1.73M2 — SIGNIFICANT CHANGE UP
EOSINOPHIL # BLD AUTO: 0.26 K/UL — SIGNIFICANT CHANGE UP (ref 0–0.5)
EOSINOPHIL NFR BLD AUTO: 4.8 % — SIGNIFICANT CHANGE UP (ref 0–6)
GLUCOSE SERPL-MCNC: 85 MG/DL — SIGNIFICANT CHANGE UP (ref 70–99)
HCT VFR BLD CALC: 36 % — SIGNIFICANT CHANGE UP (ref 34.5–45)
HGB BLD-MCNC: 12.6 G/DL — SIGNIFICANT CHANGE UP (ref 11.5–15.5)
IMM GRANULOCYTES NFR BLD AUTO: 0 % — SIGNIFICANT CHANGE UP (ref 0–0.9)
LYMPHOCYTES # BLD AUTO: 1.94 K/UL — SIGNIFICANT CHANGE UP (ref 1–3.3)
LYMPHOCYTES # BLD AUTO: 35.9 % — SIGNIFICANT CHANGE UP (ref 13–44)
MCHC RBC-ENTMCNC: 29.5 PG — SIGNIFICANT CHANGE UP (ref 27–34)
MCHC RBC-ENTMCNC: 35 GM/DL — SIGNIFICANT CHANGE UP (ref 32–36)
MCV RBC AUTO: 84.3 FL — SIGNIFICANT CHANGE UP (ref 80–100)
MONOCYTES # BLD AUTO: 0.41 K/UL — SIGNIFICANT CHANGE UP (ref 0–0.9)
MONOCYTES NFR BLD AUTO: 7.6 % — SIGNIFICANT CHANGE UP (ref 2–14)
NEUTROPHILS # BLD AUTO: 2.76 K/UL — SIGNIFICANT CHANGE UP (ref 1.8–7.4)
NEUTROPHILS NFR BLD AUTO: 51.1 % — SIGNIFICANT CHANGE UP (ref 43–77)
NT-PROBNP SERPL-SCNC: 139 PG/ML — SIGNIFICANT CHANGE UP (ref 0–300)
PLATELET # BLD AUTO: 268 K/UL — SIGNIFICANT CHANGE UP (ref 150–400)
POTASSIUM SERPL-MCNC: 3.8 MMOL/L — SIGNIFICANT CHANGE UP (ref 3.5–5.3)
POTASSIUM SERPL-SCNC: 3.8 MMOL/L — SIGNIFICANT CHANGE UP (ref 3.5–5.3)
PROT SERPL-MCNC: 7 G/DL — SIGNIFICANT CHANGE UP (ref 6.6–8.7)
RBC # BLD: 4.27 M/UL — SIGNIFICANT CHANGE UP (ref 3.8–5.2)
RBC # FLD: 14.1 % — SIGNIFICANT CHANGE UP (ref 10.3–14.5)
SODIUM SERPL-SCNC: 139 MMOL/L — SIGNIFICANT CHANGE UP (ref 135–145)
WBC # BLD: 5.4 K/UL — SIGNIFICANT CHANGE UP (ref 3.8–10.5)
WBC # FLD AUTO: 5.4 K/UL — SIGNIFICANT CHANGE UP (ref 3.8–10.5)

## 2024-07-14 PROCEDURE — 99284 EMERGENCY DEPT VISIT MOD MDM: CPT | Mod: 25

## 2024-07-14 PROCEDURE — 80053 COMPREHEN METABOLIC PANEL: CPT

## 2024-07-14 PROCEDURE — 83880 ASSAY OF NATRIURETIC PEPTIDE: CPT

## 2024-07-14 PROCEDURE — 36415 COLL VENOUS BLD VENIPUNCTURE: CPT

## 2024-07-14 PROCEDURE — 93970 EXTREMITY STUDY: CPT

## 2024-07-14 PROCEDURE — 99284 EMERGENCY DEPT VISIT MOD MDM: CPT

## 2024-07-14 PROCEDURE — 85025 COMPLETE CBC W/AUTO DIFF WBC: CPT

## 2024-07-14 NOTE — ED STATDOCS - WET READ LAUNCH FT
"Subjective   Robe MAMI Bryant is a 67 y.o. male.     History of Present Illness   Mr. Bryant is here today for routine refills and f/u. He has a known h/o severe OA and DDD with chronic neck and back pain. Has been medically managed. He continues to have severe neck pain, increased over past several months. Trouble with rotating head and \"looking up\". He has had CT scan of C spine which showed diffuse marked changes from degenerative arthritis. (he is unable to undergo MRI due to pacemaker placement). 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 He has had surgical consultation with Dr. Montiel and was felt to not be a good surgical candidate at this time. He has been referred to PT; Has had trouble having reliable transportation for apts. He is concerned about worsening bilateral wrist/arm pain assoc'd with painful paresthesia. Worst on left side. ASsoc'd with decreased  strength bilaterally. PADT reviewed and on chart.     Since last visit he has also seen optometry as well as retinal specialists for hemorrhagic PVD OS. Had injections. Has f/u scheduled for next month. Still having trouble seeing flashing lights. Trouble driving at night.    He also had eval per Dr. Cason for recurrent falls. Dr. Cason felt this was to do with his orthopedic problems than anything.    Mr. Bryant has controlled NIDDM, HTN, HLP, COPD. He is on coumadin for a fib. Followed by Dr. Tucker.    The following portions of the patient's history were reviewed and updated as appropriate: allergies, current medications, past family history, past medical history, past social history, past surgical history and problem list.    Review of Systems   Constitutional: Positive for fatigue. Negative for appetite change, fever and unexpected weight change.   HENT: Positive for congestion and postnasal drip. Negative for nosebleeds, sore throat and trouble swallowing.    Eyes: Positive for visual " disturbance (chronic).   Respiratory: Negative for cough, shortness of breath and wheezing.    Cardiovascular: Negative for chest pain, palpitations and leg swelling.   Gastrointestinal: Negative for abdominal pain, blood in stool, diarrhea, nausea and vomiting.   Endocrine: Positive for heat intolerance.   Genitourinary: Negative for dysuria and hematuria.   Musculoskeletal: Positive for arthralgias, back pain, joint swelling, myalgias, neck pain and neck stiffness.   Skin: Negative for rash and wound.   Neurological: Positive for dizziness, weakness (generalized) and numbness (left hand). Negative for seizures, syncope, speech difficulty and headaches.   Hematological: Negative for adenopathy. Bruises/bleeds easily.   Psychiatric/Behavioral: Positive for confusion (mild, intermittent) and dysphoric mood. Negative for sleep disturbance and suicidal ideas. The patient is nervous/anxious.        Objective    Vitals:    10/27/17 1040   BP: 124/80   Pulse: 68   SpO2: 97%     Body mass index is 36.78 kg/(m^2).  Last 2 weights    10/27/17  1040   Weight: 221 lb (100 kg)     Physical Exam   Constitutional: He is oriented to person, place, and time. He appears well-developed and well-nourished. He is cooperative. He does not appear ill. No distress.   HENT:   Head: Normocephalic and atraumatic.   Right Ear: Decreased hearing is noted.   Left Ear: Decreased hearing is noted.   Mouth/Throat: Oropharynx is clear and moist and mucous membranes are normal. Mucous membranes are not dry.   Eyes: Conjunctivae, EOM and lids are normal.   Neck: Neck supple. Normal carotid pulses present. No thyroid mass and no thyromegaly present.   Chronic hoarseness noted   Cardiovascular: Normal rate, regular rhythm, normal heart sounds and intact distal pulses.    Pulmonary/Chest: Effort normal. He has decreased breath sounds. He has no wheezes. He has no rhonchi. He has no rales.   Musculoskeletal:        Left shoulder: He exhibits decreased  range of motion (flexion, abduction to 90 deg), tenderness (diffuse soft tissue) and spasm.        Right wrist: He exhibits bony tenderness. He exhibits normal range of motion.        Left wrist: He exhibits bony tenderness. He exhibits normal range of motion.        Right knee: He exhibits swelling (mild). He exhibits normal range of motion.        Left knee: He exhibits decreased range of motion and swelling (mild).        Cervical back: He exhibits decreased range of motion (all planes), tenderness (diffuse soft tissue, L > R), bony tenderness (C4-C7) and spasm.        Lumbar back: He exhibits decreased range of motion, bony tenderness, deformity (loss of normal lordosis) and spasm.       Vascular Status -  His exam exhibits no right foot edema. His exam exhibits no left foot edema.  Lymphadenopathy:     He has no cervical adenopathy.        Right: No supraclavicular adenopathy present.        Left: No supraclavicular adenopathy present.   Neurological: He is alert and oriented to person, place, and time. He has normal strength. He displays no tremor. No sensory deficit. Gait (antalgic) abnormal.   + tinel's bilateral wrists   Skin: Skin is warm and dry. No bruising and no rash noted.   Psychiatric: He has a normal mood and affect. His speech is normal and behavior is normal. Cognition and memory are normal.   Nursing note and vitals reviewed.      Assessment/Plan   Robe was seen today for diabetes, hyperlipidemia and hypertension.    Diagnoses and all orders for this visit:    Essential hypertension    Paroxysmal atrial fibrillation    Chronic obstructive pulmonary disease, unspecified COPD type    Type 2 diabetes mellitus with stage 3 chronic kidney disease, without long-term current use of insulin  -     POC Glucose Fingerstick    Chronic pain syndrome  -     oxyCODONE-acetaminophen (PERCOCET)  MG per tablet; Take 1 tablet by mouth Every 6 (Six) Hours As Needed for Severe Pain .    Chronic neck  pain  -     XR Spine Cervical 3 View; Future  -     EMG & Nerve Conduction Test; Future    Paresthesia of left arm  -     XR Spine Cervical 3 View; Future  -     EMG & Nerve Conduction Test; Future    Cervical spine arthritis  -     oxyCODONE-acetaminophen (PERCOCET)  MG per tablet; Take 1 tablet by mouth Every 6 (Six) Hours As Needed for Severe Pain .  -     EMG & Nerve Conduction Test; Future    Radicular pain in left arm  -     oxyCODONE-acetaminophen (PERCOCET)  MG per tablet; Take 1 tablet by mouth Every 6 (Six) Hours As Needed for Severe Pain .  -     EMG & Nerve Conduction Test; Future    Neck pain  -     oxyCODONE-acetaminophen (PERCOCET)  MG per tablet; Take 1 tablet by mouth Every 6 (Six) Hours As Needed for Severe Pain .    Bilateral wrist pain  -     EMG & Nerve Conduction Test; Future    BP with improved control.  NIDDM controlled.  A fib rate controlled. Anti-coagulation per cardiology.  COPD stable.  Worsening chronic pain syndrome particularly in neck and BUEs assoc'd with suspected bilateral CTSx; may also have cervical radiculopathy. Rec'd f/u EMG/NCS as well as baseline xray due to h/o recent falls, and f/u imaging/referral as indicated.  I will contact patient regarding test results and provide instructions regarding any necessary changes in plan of care.  Routine f/u in 3 months, sooner as needed/instructed.  He is encouraged to schedule Medicare Wellness visit at his earliest convenience.  F/U with specialists as scheduled.  As part of patient's treatment plan I am prescribing a controlled substance.  The patient has been made aware of the appropriate use of such medications, including potential risk of somnolence, limited ability to drive and/or work safely, and potential for dependence and/or overdose.  It has also been made clear that these medications are for use by this patient only, without concomitant use of alcohol or other substances, unless prescribed.  ALMA nix  reviewed and scanned into chart.  Last ALMA date 10/27/17.  Patient was encouraged to keep me informed of any acute changes, lack of improvement, or any new concerning symptoms.  Pt is aware of reasons to seek emergent care.  Patient voiced understanding of all instructions and denied further questions.                There are no Wet Read(s) to document.

## 2024-07-14 NOTE — ED STATDOCS - OBJECTIVE STATEMENT
78-year-old female was traveling in [place] spent a lot of time walking in [place] comes back today with lower extremity swelling bilaterally and a rash on the right lower extremity.  Complains of mild pain.  No fever.  Patient was seen in urgent care and referred here for lab work and bilateral Dopplers.  No shortness of breath no chest pain.  History of hypercholesteremia.

## 2024-07-14 NOTE — ED STATDOCS - ATTENDING APP SHARED VISIT CONTRIBUTION OF CARE
I, Alana Mcclellan, performed the initial face to face bedside interview with this patient regarding history of present illness, review of symptoms and relevant past medical, social and family history.  I completed an independent physical examination.  I was the initial provider who evaluated this patient. I have signed out the follow up of any pending tests (i.e. labs, radiological studies) to the ACP.  I have communicated the patient’s plan of care and disposition with the ACP.  The history, relevant review of systems, past medical and surgical history, medical decision making, and physical examination was documented by the scribe in my presence and I attest to the accuracy of the documentation.

## 2024-07-14 NOTE — ED STATDOCS - CLINICAL SUMMARY MEDICAL DECISION MAKING FREE TEXT BOX
78-year-old female was traveling in [place] spent a lot of time walking in [place] comes back today with lower extremity swelling bilaterally and a rash on the right lower extremity.  Complains of mild pain.  No fever.  Patient was seen in urgent care and referred here for lab work and bilateral Dopplers.  No shortness of breath no chest pain.  History of hypercholesteremia.   in n acute distress   elevated BP   LUngs clear   cardiac exam wnl   no pitting edema   tenderness   does not want pain meds 78-year-old female was traveling in [place] spent a lot of time walking in [place] comes back today with lower extremity swelling bilaterally and a rash on the right lower extremity.  Complains of mild pain.  No fever.  Patient was seen in urgent care and referred here for lab work and bilateral Dopplers.  No shortness of breath no chest pain.  History of hypercholesteremia.   in n acute distress   elevated BP   LUngs clear   cardiac exam wnl   no pitting edema   tenderness   does not want pain madie Watkins PA-C: Pt discussed at length with attending HPI/ROS/PE confirmed, Pt seen resting comfortable in no acute distress in chair. Pt with no acute findings on evaluation, pt cleared for dc home with supportive care, follow up to PCP, Pt educated about when to return to the ED if needed. PT verbalizes that he understands all instructions and results. Pt informed that ED is open and available 24/7 365 days a yr, encouraged to return to the ED if they have any change in condition, or feel the need for revaluation.

## 2024-07-14 NOTE — ED ADULT NURSE NOTE - CHIEF COMPLAINT QUOTE
pt sent in by  in [facility] for swelling to lower extremties and a rash noted to right foot MD sent her in to rule out CHF denies SOB at this time only complaining of swelling and rash

## 2024-07-14 NOTE — ED STATDOCS - ADDITIONAL NOTES AND INSTRUCTIONS:
PT was evaluated At [facility] ED and was found to have a condition that warranted time of to rest and heal from WORK/SCHOOL.   Nash Watkins PA-C

## 2024-07-14 NOTE — ED STATDOCS - PATIENT PORTAL LINK FT
You can access the FollowMyHealth Patient Portal offered by [facility] by registering at the following website: [email or web address]. By joining [employer]’s FollowMyHealth portal, you will also be able to view your health information using other applications (apps) compatible with our system.

## 2024-07-14 NOTE — ED ADULT TRIAGE NOTE - CHIEF COMPLAINT QUOTE
pt sent in by  in [facility] for swelling to lower extremties and a rash noted to right foot MD sent her in to rule out CHF denies SOB at this time only complaining of swelling and rash

## 2024-07-14 NOTE — ED ADULT NURSE NOTE - OBJECTIVE STATEMENT
Pt presents to ED C/O b/l LE swelling and rash to R foot. Pt sent in by  to r/o CHF. Pt able to ambulate independently. No apparent distress noted at this time, denies CP, SOB. Family @ bedside.

## 2024-07-14 NOTE — ED STATDOCS - NSFOLLOWUPINSTRUCTIONS_ED_ALL_ED_FT
Patient education: Swelling (The Basics)  Written by the doctors and editors at [facility]  Please read the Disclaimer at the end of this page.    What is swelling?  Swelling happens when fluid collects in small spaces around tissues and organs inside the body. Another word for swelling is "edema." Some common parts of the body where people can have swelling are the:    ?Lower legs or hands    ?Belly    ?Chest – Swelling can occur in the lungs or in the space around the lungs.    Swelling in the legs, hands, and belly can be uncomfortable and can be a symptom of a more serious condition. Swelling in the lungs can be life-threatening, because it is usually a symptom of a serious heart problem.    What are the symptoms of swelling?  Symptoms of swelling can include:    ?Puffiness of the skin, which can cause the skin to look stretched and shiny – This often occurs with swelling in the lower legs or lower back, and can be worse after people sit or stand for a long time (figure 1).    ?Increase in belly size (with swelling of the belly)    ?Trouble breathing (with swelling in the chest)    What are the causes of swelling?  Different conditions can cause swelling. Some of these include:    ?Problems with veins (blood vessels) in the legs – Normally, veins carry blood from the body back to the heart. But if valves in the veins do not work well, the veins cannot pump enough blood back to the heart. This can cause swelling in the lower legs.    ?Blood clots – People who have a blood clot blocking a leg vein can have swelling in the feet or ankles.    ?Pregnancy – Pregnant people can have swelling in the hands, feet, or face.    ?Monthly periods – People can have swelling in different parts of their body before they get their period.    ?Medicines – Swelling can be a side effect of some medicines, such as medicines for diabetes, high blood pressure, or pain.    ?Kidney problems – People who have certain kidney problems can have swelling in the lower legs or around the eyes.    ?Heart failure – Heart failure is a type of heart problem in which the heart cannot pump normally. People with heart failure can have swelling in the legs, belly, or lungs.    ?Liver problems – People who have certain liver problems can have swelling in the belly or lower legs.    ?Travel – People who sit for a long time when traveling can have swelling in the lower legs.    When should I call my doctor or nurse?  Call your doctor or nurse if you have new swelling:    ?In 1 or both of your legs    ?In your hands    ?In your belly    ?Around your eyes    You should also call your doctor or nurse if you travel and sit for a long time, and then have leg pain or swelling that does not go away after a few days.    How is swelling treated?  Doctors can treat swelling in different ways, depending on the cause. Treatment can include 1 or more of the following:    ?Treatment for the medical condition that is causing the swelling    ?Diet changes to reduce the amount of salt in the food that you eat    ?Medicines to help your body get rid of extra fluid    ?Special socks called "compression stockings" – These fit tightly over the ankle and leg, and can reduce leg swelling. If your doctor or nurse recommends that you wear them, they will tell you which type to wear and how to put them on (figure 2 and figure 3 and table 1).    ?Raising the legs up – Some people can reduce swelling in the legs, ankles, and feet by raising their legs up 3 or 4 times a day for 30 minutes each time. The legs need to be raised above the level of the heart.    Not all types of swelling need treatment. For example, swelling that occurs during pregnancy or before monthly periods usually does not need treatment.    How can I help prevent leg swelling when I travel on long flights?  To help prevent leg swelling on flights that are longer than 6 to 8 hours:    ?Stand up and walk around every hour or 2.    ?Do not smoke before traveling.    ?Wear loose-fitting and comfortable clothes.    ?Ask if you can sit in the bulkhead or emergency exit row.    ?Point and flex your feet, and bend your knees from time to time.    ?Drink plenty of fluids, and do not drink alcohol.    ?Do not take medicines such as sleeping pills that can prevent you from getting up and moving around.    More on this topic

## 2024-07-15 VITALS
RESPIRATION RATE: 18 BRPM | SYSTOLIC BLOOD PRESSURE: 179 MMHG | HEART RATE: 73 BPM | DIASTOLIC BLOOD PRESSURE: 70 MMHG | OXYGEN SATURATION: 99 %

## 2024-07-15 PROCEDURE — 93970 EXTREMITY STUDY: CPT | Mod: 26

## 2024-08-16 ENCOUNTER — APPOINTMENT (OUTPATIENT)
Dept: CARDIOLOGY | Facility: CLINIC | Age: 79
End: 2024-08-16
Payer: MEDICARE

## 2024-08-16 ENCOUNTER — NON-APPOINTMENT (OUTPATIENT)
Age: 79
End: 2024-08-16

## 2024-08-16 VITALS
HEART RATE: 79 BPM | OXYGEN SATURATION: 99 % | WEIGHT: 108 LBS | BODY MASS INDEX: 19.88 KG/M2 | DIASTOLIC BLOOD PRESSURE: 68 MMHG | HEIGHT: 62 IN | SYSTOLIC BLOOD PRESSURE: 120 MMHG

## 2024-08-16 VITALS — DIASTOLIC BLOOD PRESSURE: 58 MMHG | SYSTOLIC BLOOD PRESSURE: 118 MMHG

## 2024-08-16 DIAGNOSIS — Z82.49 FAMILY HISTORY OF ISCHEMIC HEART DISEASE AND OTHER DISEASES OF THE CIRCULATORY SYSTEM: ICD-10-CM

## 2024-08-16 DIAGNOSIS — E78.5 HYPERLIPIDEMIA, UNSPECIFIED: ICD-10-CM

## 2024-08-16 DIAGNOSIS — I87.2 VENOUS INSUFFICIENCY (CHRONIC) (PERIPHERAL): ICD-10-CM

## 2024-08-16 DIAGNOSIS — K57.90 DIVERTICULOSIS OF INTESTINE, PART UNSPECIFIED, W/OUT PERFORATION OR ABSCESS W/OUT BLEEDING: ICD-10-CM

## 2024-08-16 DIAGNOSIS — R60.0 LOCALIZED EDEMA: ICD-10-CM

## 2024-08-16 DIAGNOSIS — I10 ESSENTIAL (PRIMARY) HYPERTENSION: ICD-10-CM

## 2024-08-16 PROCEDURE — G2211 COMPLEX E/M VISIT ADD ON: CPT

## 2024-08-16 PROCEDURE — 93000 ELECTROCARDIOGRAM COMPLETE: CPT

## 2024-08-16 PROCEDURE — 99204 OFFICE O/P NEW MOD 45 MIN: CPT

## 2024-08-16 RX ORDER — GLUCOSAMINE/CHONDR SU A SOD 750-600 MG
600-5 TABLET ORAL
Refills: 0 | Status: ACTIVE | COMMUNITY
Start: 2024-08-16

## 2024-08-16 NOTE — HISTORY OF PRESENT ILLNESS
[FreeTextEntry1] : 77-year-old female who presented with swelling in the legs while walking around [place], which was the first time this had occurred. She reported that the swelling would last for about 6 hours after travel only and then resolve, but this time it persisted for a longer duration. Family History: The patient's grandmother from [facility] had some unspecified heart condition, but details are unknown. Past Medical History: The patient has been treated for cholesterol issues by Dr. Barry, but no other significant past medical history was mentioned. Impression: Based on the subjective information, family history, and lack of significant past medical history, the initial impression is that the patient's leg swelling may be due to a venous issue, possibly related to prolonged travel or immobility.

## 2024-08-16 NOTE — DISCUSSION/SUMMARY
[FreeTextEntry1] : The assessment is that the patient's leg swelling is likely due to a venous issue, possibly related to prolonged travel or immobility, rather than a cardiac issue based on the normal pro-BNP level, EKG, and blood pressure. - Problems : - Leg swelling - Possible venous insufficiency  The plan is to order a venous ultrasound (sonogram) to evaluate for venous insufficiency or other venous issues that may be causing the leg swelling. - Plan : - 2d TTE. - venous ultrasound in hospital was negative. - Recommend the use of compression stockings, especially after periods of prolonged sitting or immobility - Advise the patient to increase water intake and reduce salt intake, especially during and after travel - Follow-up after the ultrasound results are available - low salt  ekg non ischemic, no evidence of acs, heart failure, arrythmia, significant valvular disease at this time.  The patient is aware of alarm cardiac type symptoms, will call with questions or concerns and is aware to activate 911 and/or present to the closest emergency department if concerns.  As with all my patients, I would strongly pursue preventative cardiology, lifestyle modifications which are necessary for long-term cardiovascular health.  The following is recommended: Advised a plant based, limited salt, low fat, minimal dairy diet, stress reduction/psychosomatic management, sleep hygiene/NELSON testing and healthy weight loss, annual influenza vaccine, medication compliance, high risk behavior mitigation (I.e. tobacco abstinence, alcohol abstinence, recreational/illicit drug use abstinence), increased exercise activity as per AHA/ACC standard for long term cardiovascular risk reduction. [EKG obtained to assist in diagnosis and management of assessed problem(s)] : EKG obtained to assist in diagnosis and management of assessed problem(s)

## 2024-08-30 ENCOUNTER — APPOINTMENT (OUTPATIENT)
Dept: CARDIOLOGY | Facility: CLINIC | Age: 79
End: 2024-08-30
Payer: MEDICARE

## 2024-08-30 PROCEDURE — 93306 TTE W/DOPPLER COMPLETE: CPT

## 2024-10-16 ENCOUNTER — OFFICE (OUTPATIENT)
Dept: URBAN - METROPOLITAN AREA CLINIC 115 | Facility: CLINIC | Age: 79
Setting detail: OPHTHALMOLOGY
End: 2024-10-16
Payer: MEDICARE

## 2024-10-16 DIAGNOSIS — H43.392: ICD-10-CM

## 2024-10-16 DIAGNOSIS — H04.123: ICD-10-CM

## 2024-10-16 DIAGNOSIS — H25.13: ICD-10-CM

## 2024-10-16 PROBLEM — H02.834 DERMATOCHALASIS ; BOTH EYES: Status: ACTIVE | Noted: 2024-10-16

## 2024-10-16 PROBLEM — H02.831 DERMATOCHALASIS ; BOTH EYES: Status: ACTIVE | Noted: 2024-10-16

## 2024-10-16 PROCEDURE — 92004 COMPRE OPH EXAM NEW PT 1/>: CPT | Performed by: OPHTHALMOLOGY

## 2024-10-16 PROCEDURE — 92250 FUNDUS PHOTOGRAPHY W/I&R: CPT | Performed by: OPHTHALMOLOGY

## 2024-10-16 ASSESSMENT — REFRACTION_AUTOREFRACTION
OS_SPHERE: +1.25
OD_AXIS: 173
OS_AXIS: 085
OD_CYLINDER: -0.50
OD_SPHERE: +3.25
OS_CYLINDER: -0.50

## 2024-10-16 ASSESSMENT — SUPERFICIAL PUNCTATE KERATITIS (SPK)
OD_SPK: 1+ 2+
OS_SPK: 1+ 2+

## 2024-10-16 ASSESSMENT — TONOMETRY
OS_IOP_MMHG: 20
OD_IOP_MMHG: 19

## 2024-10-16 ASSESSMENT — VISUAL ACUITY
OD_BCVA: 20/20
OS_BCVA: 20/25

## 2024-10-16 ASSESSMENT — LID POSITION - DERMATOCHALASIS
OS_DERMATOCHALASIS: ABSENT
OD_DERMATOCHALASIS: ABSENT

## 2024-10-16 ASSESSMENT — CONFRONTATIONAL VISUAL FIELD TEST (CVF)
OD_FINDINGS: FULL
OS_FINDINGS: FULL

## 2024-12-17 PROBLEM — G43.909 MIGRAINE, UNSPECIFIED, NOT INTRACTABLE, WITHOUT STATUS MIGRAINOSUS: Status: ACTIVE | Noted: 2024-12-17

## 2024-12-17 PROBLEM — H43.393 VITREOUS FLOATERS; BOTH EYES: Status: ACTIVE | Noted: 2024-12-17

## 2025-02-13 ENCOUNTER — EMERGENCY (EMERGENCY)
Facility: HOSPITAL | Age: 80
LOS: 1 days | Discharge: HOME | End: 2025-02-13
Attending: STUDENT IN AN ORGANIZED HEALTH CARE EDUCATION/TRAINING PROGRAM
Payer: MEDICARE

## 2025-02-13 VITALS
DIASTOLIC BLOOD PRESSURE: 65 MMHG | HEART RATE: 85 BPM | OXYGEN SATURATION: 99 % | TEMPERATURE: 98 F | SYSTOLIC BLOOD PRESSURE: 129 MMHG | RESPIRATION RATE: 18 BRPM

## 2025-02-13 VITALS
SYSTOLIC BLOOD PRESSURE: 128 MMHG | RESPIRATION RATE: 18 BRPM | WEIGHT: 110.01 LBS | DIASTOLIC BLOOD PRESSURE: 69 MMHG | HEART RATE: 95 BPM | HEIGHT: 64 IN | OXYGEN SATURATION: 100 % | TEMPERATURE: 98 F

## 2025-02-13 LAB
ALBUMIN SERPL ELPH-MCNC: 4 G/DL — SIGNIFICANT CHANGE UP (ref 3.3–5.2)
ALP SERPL-CCNC: 91 U/L — SIGNIFICANT CHANGE UP (ref 40–120)
ALT FLD-CCNC: 25 U/L — SIGNIFICANT CHANGE UP
ANION GAP SERPL CALC-SCNC: 13 MMOL/L — SIGNIFICANT CHANGE UP (ref 5–17)
APPEARANCE UR: CLEAR — SIGNIFICANT CHANGE UP
APTT BLD: 34.8 SEC — SIGNIFICANT CHANGE UP (ref 24.5–35.6)
AST SERPL-CCNC: 26 U/L — SIGNIFICANT CHANGE UP
BACTERIA # UR AUTO: NEGATIVE /HPF — SIGNIFICANT CHANGE UP
BASOPHILS # BLD AUTO: 0.01 K/UL — SIGNIFICANT CHANGE UP (ref 0–0.2)
BASOPHILS # BLD MANUAL: 0 K/UL — SIGNIFICANT CHANGE UP (ref 0–0.2)
BASOPHILS NFR BLD AUTO: 0.1 % — SIGNIFICANT CHANGE UP (ref 0–2)
BASOPHILS NFR BLD MANUAL: 0 % — SIGNIFICANT CHANGE UP (ref 0–2)
BILIRUB SERPL-MCNC: 0.7 MG/DL — SIGNIFICANT CHANGE UP (ref 0.4–2)
BILIRUB UR-MCNC: NEGATIVE — SIGNIFICANT CHANGE UP
BUN SERPL-MCNC: 18.2 MG/DL — SIGNIFICANT CHANGE UP (ref 8–20)
CALCIUM SERPL-MCNC: 9.1 MG/DL — SIGNIFICANT CHANGE UP (ref 8.4–10.5)
CAST: 0 /LPF — SIGNIFICANT CHANGE UP (ref 0–4)
CHLORIDE SERPL-SCNC: 101 MMOL/L — SIGNIFICANT CHANGE UP (ref 96–108)
CO2 SERPL-SCNC: 23 MMOL/L — SIGNIFICANT CHANGE UP (ref 22–29)
COLOR SPEC: YELLOW — SIGNIFICANT CHANGE UP
CREAT SERPL-MCNC: 0.6 MG/DL — SIGNIFICANT CHANGE UP (ref 0.5–1.3)
DIFF PNL FLD: NEGATIVE — SIGNIFICANT CHANGE UP
EGFR: 91 ML/MIN/1.73M2 — SIGNIFICANT CHANGE UP
EOSINOPHIL # BLD AUTO: 0.1 K/UL — SIGNIFICANT CHANGE UP (ref 0–0.5)
EOSINOPHIL # BLD MANUAL: 0 K/UL — SIGNIFICANT CHANGE UP (ref 0–0.5)
EOSINOPHIL NFR BLD AUTO: 1.1 % — SIGNIFICANT CHANGE UP (ref 0–6)
EOSINOPHIL NFR BLD MANUAL: 0 % — SIGNIFICANT CHANGE UP (ref 0–6)
GLUCOSE SERPL-MCNC: 73 MG/DL — SIGNIFICANT CHANGE UP (ref 70–99)
GLUCOSE UR QL: NEGATIVE MG/DL — SIGNIFICANT CHANGE UP
HCT VFR BLD CALC: 40.2 % — SIGNIFICANT CHANGE UP (ref 34.5–45)
HGB BLD-MCNC: 13.5 G/DL — SIGNIFICANT CHANGE UP (ref 11.5–15.5)
IMM GRANULOCYTES # BLD AUTO: 0.03 K/UL — SIGNIFICANT CHANGE UP (ref 0–0.07)
IMM GRANULOCYTES NFR BLD AUTO: 0.3 % — SIGNIFICANT CHANGE UP (ref 0–0.9)
INR BLD: 1.07 RATIO — SIGNIFICANT CHANGE UP (ref 0.85–1.16)
KETONES UR-MCNC: 40 MG/DL
LEUKOCYTE ESTERASE UR-ACNC: NEGATIVE — SIGNIFICANT CHANGE UP
LIDOCAIN IGE QN: 22 U/L — SIGNIFICANT CHANGE UP (ref 22–51)
LYMPHOCYTES # BLD AUTO: 0.38 K/UL — LOW (ref 1–3.3)
LYMPHOCYTES # BLD MANUAL: 0.24 K/UL — LOW (ref 1–3.3)
LYMPHOCYTES NFR BLD AUTO: 4.1 % — LOW (ref 13–44)
LYMPHOCYTES NFR BLD MANUAL: 2.6 % — LOW (ref 13–44)
MCHC RBC-ENTMCNC: 28.8 PG — SIGNIFICANT CHANGE UP (ref 27–34)
MCHC RBC-ENTMCNC: 33.6 G/DL — SIGNIFICANT CHANGE UP (ref 32–36)
MCV RBC AUTO: 85.7 FL — SIGNIFICANT CHANGE UP (ref 80–100)
MONOCYTES # BLD AUTO: 0.33 K/UL — SIGNIFICANT CHANGE UP (ref 0–0.9)
MONOCYTES # BLD MANUAL: 0.32 K/UL — SIGNIFICANT CHANGE UP (ref 0–0.9)
MONOCYTES NFR BLD AUTO: 3.6 % — SIGNIFICANT CHANGE UP (ref 2–14)
MONOCYTES NFR BLD MANUAL: 3.5 % — SIGNIFICANT CHANGE UP (ref 2–14)
NEUTROPHILS # BLD AUTO: 8.34 K/UL — HIGH (ref 1.8–7.4)
NEUTROPHILS # BLD MANUAL: 8.63 K/UL — HIGH (ref 1.8–7.4)
NEUTROPHILS NFR BLD AUTO: 90.8 % — HIGH (ref 43–77)
NEUTROPHILS NFR BLD MANUAL: 93.9 % — HIGH (ref 43–77)
NITRITE UR-MCNC: NEGATIVE — SIGNIFICANT CHANGE UP
NRBC # BLD AUTO: 0 K/UL — SIGNIFICANT CHANGE UP (ref 0–0)
NRBC # FLD: 0 K/UL — SIGNIFICANT CHANGE UP (ref 0–0)
NRBC BLD AUTO-RTO: 0 /100 WBCS — SIGNIFICANT CHANGE UP (ref 0–0)
PH UR: 6.5 — SIGNIFICANT CHANGE UP (ref 5–8)
PLAT MORPH BLD: NORMAL — SIGNIFICANT CHANGE UP
PLATELET # BLD AUTO: 289 K/UL — SIGNIFICANT CHANGE UP (ref 150–400)
PMV BLD: 9.5 FL — SIGNIFICANT CHANGE UP (ref 7–13)
POTASSIUM SERPL-MCNC: 4.8 MMOL/L — SIGNIFICANT CHANGE UP (ref 3.5–5.3)
POTASSIUM SERPL-SCNC: 4.8 MMOL/L — SIGNIFICANT CHANGE UP (ref 3.5–5.3)
PROT SERPL-MCNC: 7.3 G/DL — SIGNIFICANT CHANGE UP (ref 6.6–8.7)
PROT UR-MCNC: NEGATIVE MG/DL — SIGNIFICANT CHANGE UP
PROTHROM AB SERPL-ACNC: 12.4 SEC — SIGNIFICANT CHANGE UP (ref 9.9–13.4)
RAPID RVP RESULT: SIGNIFICANT CHANGE UP
RBC # BLD: 4.69 M/UL — SIGNIFICANT CHANGE UP (ref 3.8–5.2)
RBC # FLD: 13 % — SIGNIFICANT CHANGE UP (ref 10.3–14.5)
RBC BLD AUTO: NORMAL — SIGNIFICANT CHANGE UP
RBC CASTS # UR COMP ASSIST: 1 /HPF — SIGNIFICANT CHANGE UP (ref 0–4)
SARS-COV-2 RNA SPEC QL NAA+PROBE: SIGNIFICANT CHANGE UP
SODIUM SERPL-SCNC: 137 MMOL/L — SIGNIFICANT CHANGE UP (ref 135–145)
SP GR SPEC: 1.02 — SIGNIFICANT CHANGE UP (ref 1–1.03)
SQUAMOUS # UR AUTO: 0 /HPF — SIGNIFICANT CHANGE UP (ref 0–5)
UROBILINOGEN FLD QL: 0.2 MG/DL — SIGNIFICANT CHANGE UP (ref 0.2–1)
WBC # BLD: 9.19 K/UL — SIGNIFICANT CHANGE UP (ref 3.8–10.5)
WBC # FLD AUTO: 9.19 K/UL — SIGNIFICANT CHANGE UP (ref 3.8–10.5)
WBC UR QL: 0 /HPF — SIGNIFICANT CHANGE UP (ref 0–5)

## 2025-02-13 PROCEDURE — 99285 EMERGENCY DEPT VISIT HI MDM: CPT | Mod: 25

## 2025-02-13 PROCEDURE — 99285 EMERGENCY DEPT VISIT HI MDM: CPT

## 2025-02-13 PROCEDURE — 87086 URINE CULTURE/COLONY COUNT: CPT

## 2025-02-13 PROCEDURE — 83690 ASSAY OF LIPASE: CPT

## 2025-02-13 PROCEDURE — 96375 TX/PRO/DX INJ NEW DRUG ADDON: CPT

## 2025-02-13 PROCEDURE — 0225U NFCT DS DNA&RNA 21 SARSCOV2: CPT

## 2025-02-13 PROCEDURE — 93005 ELECTROCARDIOGRAM TRACING: CPT

## 2025-02-13 PROCEDURE — 85730 THROMBOPLASTIN TIME PARTIAL: CPT

## 2025-02-13 PROCEDURE — 74177 CT ABD & PELVIS W/CONTRAST: CPT | Mod: 26

## 2025-02-13 PROCEDURE — 93010 ELECTROCARDIOGRAM REPORT: CPT

## 2025-02-13 PROCEDURE — 80053 COMPREHEN METABOLIC PANEL: CPT

## 2025-02-13 PROCEDURE — 36415 COLL VENOUS BLD VENIPUNCTURE: CPT

## 2025-02-13 PROCEDURE — 74177 CT ABD & PELVIS W/CONTRAST: CPT | Mod: MC

## 2025-02-13 PROCEDURE — 81001 URINALYSIS AUTO W/SCOPE: CPT

## 2025-02-13 PROCEDURE — 96374 THER/PROPH/DIAG INJ IV PUSH: CPT | Mod: XU

## 2025-02-13 PROCEDURE — 85610 PROTHROMBIN TIME: CPT

## 2025-02-13 PROCEDURE — 85025 COMPLETE CBC W/AUTO DIFF WBC: CPT

## 2025-02-13 RX ORDER — ACETAMINOPHEN 160 MG/5ML
750 SUSPENSION ORAL ONCE
Refills: 0 | Status: COMPLETED | OUTPATIENT
Start: 2025-02-13 | End: 2025-02-13

## 2025-02-13 RX ORDER — ONDANSETRON 4 MG/1
4 TABLET, ORALLY DISINTEGRATING ORAL ONCE
Refills: 0 | Status: COMPLETED | OUTPATIENT
Start: 2025-02-13 | End: 2025-02-13

## 2025-02-13 RX ORDER — BACTERIOSTATIC SODIUM CHLORIDE 0.9 %
1000 VIAL (ML) INJECTION ONCE
Refills: 0 | Status: COMPLETED | OUTPATIENT
Start: 2025-02-13 | End: 2025-02-13

## 2025-02-13 RX ADMIN — ONDANSETRON 4 MILLIGRAM(S): 4 TABLET, ORALLY DISINTEGRATING ORAL at 18:02

## 2025-02-13 RX ADMIN — ACETAMINOPHEN 300 MILLIGRAM(S): 160 SUSPENSION ORAL at 18:02

## 2025-02-13 RX ADMIN — Medication 1000 MILLILITER(S): at 18:03

## 2025-02-13 NOTE — ED ADULT TRIAGE NOTE - CHIEF COMPLAINT QUOTE
pt sent from urgent care for left side abdominal pain, vomiting green bile, sent to r/o diverticulitis  A&Ox3, resp wnl

## 2025-02-13 NOTE — ED PROVIDER NOTE - OBJECTIVE STATEMENT
79-year-old female presenting for evaluation after having been seen at urgent care and being noted to have left lower quadrant tenderness to palpation and bilious vomiting and sent here for concern for diverticulitis.  Patient reports onset of symptoms this morning.  Had been feeling well yesterday.  No ill contacts that she is aware of.  No bloody vomit, no black tarry vomit.  Appeared light green.  Notes that when the vomiting started she also had onset of multiple episodes of diarrheal bowel movements no black tarry stool or bright red bloody appearance.

## 2025-02-13 NOTE — ED ADULT NURSE NOTE - OBJECTIVE STATEMENT
AxOx4. pt sent from urgent care for left side abdominal pain, vomiting green bile, sent to r/o diverticulitis. IV placed, labs sent, medicated as per orders.

## 2025-02-13 NOTE — ED PROVIDER NOTE - CLINICAL SUMMARY MEDICAL DECISION MAKING FREE TEXT BOX
79-year-old female presenting for evaluation of acute onset of abdominal discomfort as well as nausea vomiting and diarrhea.  On examination with tenderness palpation along the left lower quadrant.  Differential diagnosis includes colitis, diverticulitis, amongst other etiologies.  Will check labs, CT abdomen and pelvis, treat symptoms in interim with IV fluid bolus, Zofran, APAP, follow-up studies reassess disposition pending clinical course and results.

## 2025-02-13 NOTE — ED PROVIDER NOTE - PHYSICAL EXAMINATION
Gen: NAD, non-toxic, conversational  Eyes: PERRLA, EOMI   HENT: Normocephalic, atraumatic. External ears normal, no rhinorrhea, moist mucous membranes.   CV: RRR, no M/R/G  Resp: CTAB, non-labored, speaking without difficulty on room air  Abd: soft, llq focal ttp but abdomen otherwise soft non-tender non-distended, non rigid, no guarding or rebound tenderness  Back: No CVAT bilaterally, no midline ttp  Skin: dry, wwp   Neuro: AOx3, speech is fluent and appropriate  Psych: Mood ok, affect euthymic

## 2025-02-13 NOTE — ED PROVIDER NOTE - PATIENT PORTAL LINK FT
You can access the FollowMyHealth Patient Portal offered by [facility] by registering at the following website: [email or web address]. By joining [employer]’s FollowMyHealth portal, you will also be able to view your health information using other applications (apps) compatible with our system.

## 2025-02-14 LAB
CULTURE RESULTS: SIGNIFICANT CHANGE UP
SPECIMEN SOURCE: SIGNIFICANT CHANGE UP

## 2025-03-29 ENCOUNTER — EMERGENCY (EMERGENCY)
Facility: HOSPITAL | Age: 80
LOS: 1 days | Discharge: HOME | End: 2025-03-29
Attending: STUDENT IN AN ORGANIZED HEALTH CARE EDUCATION/TRAINING PROGRAM
Payer: MEDICARE

## 2025-03-29 ENCOUNTER — NON-APPOINTMENT (OUTPATIENT)
Age: 80
End: 2025-03-29

## 2025-03-29 VITALS
DIASTOLIC BLOOD PRESSURE: 53 MMHG | HEART RATE: 80 BPM | OXYGEN SATURATION: 95 % | TEMPERATURE: 98 F | RESPIRATION RATE: 18 BRPM | SYSTOLIC BLOOD PRESSURE: 117 MMHG

## 2025-03-29 VITALS
WEIGHT: 96.34 LBS | RESPIRATION RATE: 18 BRPM | HEIGHT: 64 IN | SYSTOLIC BLOOD PRESSURE: 97 MMHG | OXYGEN SATURATION: 99 % | TEMPERATURE: 99 F | DIASTOLIC BLOOD PRESSURE: 64 MMHG | HEART RATE: 98 BPM

## 2025-03-29 LAB
ALBUMIN SERPL ELPH-MCNC: 3.7 G/DL — SIGNIFICANT CHANGE UP (ref 3.3–5.2)
ALP SERPL-CCNC: 90 U/L — SIGNIFICANT CHANGE UP (ref 40–120)
ALT FLD-CCNC: 26 U/L — SIGNIFICANT CHANGE UP
ANION GAP SERPL CALC-SCNC: 12 MMOL/L — SIGNIFICANT CHANGE UP (ref 5–17)
AST SERPL-CCNC: 22 U/L — SIGNIFICANT CHANGE UP
BASOPHILS # BLD AUTO: 0.02 K/UL — SIGNIFICANT CHANGE UP (ref 0–0.2)
BASOPHILS # BLD MANUAL: 0 K/UL — SIGNIFICANT CHANGE UP (ref 0–0.2)
BASOPHILS NFR BLD AUTO: 0.3 % — SIGNIFICANT CHANGE UP (ref 0–2)
BASOPHILS NFR BLD MANUAL: 0 % — SIGNIFICANT CHANGE UP (ref 0–2)
BILIRUB SERPL-MCNC: 0.3 MG/DL — LOW (ref 0.4–2)
BUN SERPL-MCNC: 16.3 MG/DL — SIGNIFICANT CHANGE UP (ref 8–20)
CALCIUM SERPL-MCNC: 8.7 MG/DL — SIGNIFICANT CHANGE UP (ref 8.4–10.5)
CHLORIDE SERPL-SCNC: 103 MMOL/L — SIGNIFICANT CHANGE UP (ref 96–108)
CO2 SERPL-SCNC: 20 MMOL/L — LOW (ref 22–29)
CREAT SERPL-MCNC: 1.03 MG/DL — SIGNIFICANT CHANGE UP (ref 0.5–1.3)
EGFR: 55 ML/MIN/1.73M2 — LOW
EGFR: 55 ML/MIN/1.73M2 — LOW
EOSINOPHIL # BLD AUTO: 0.05 K/UL — SIGNIFICANT CHANGE UP (ref 0–0.5)
EOSINOPHIL # BLD MANUAL: 0 K/UL — SIGNIFICANT CHANGE UP (ref 0–0.5)
EOSINOPHIL NFR BLD AUTO: 0.8 % — SIGNIFICANT CHANGE UP (ref 0–6)
EOSINOPHIL NFR BLD MANUAL: 0 % — SIGNIFICANT CHANGE UP (ref 0–6)
GIANT PLATELETS BLD QL SMEAR: PRESENT
GLUCOSE SERPL-MCNC: 95 MG/DL — SIGNIFICANT CHANGE UP (ref 70–99)
HCT VFR BLD CALC: 39.5 % — SIGNIFICANT CHANGE UP (ref 34.5–45)
HGB BLD-MCNC: 13.4 G/DL — SIGNIFICANT CHANGE UP (ref 11.5–15.5)
IMM GRANULOCYTES # BLD AUTO: 0.01 K/UL — SIGNIFICANT CHANGE UP (ref 0–0.07)
IMM GRANULOCYTES NFR BLD AUTO: 0.2 % — SIGNIFICANT CHANGE UP (ref 0–0.9)
LIDOCAIN IGE QN: 23 U/L — SIGNIFICANT CHANGE UP (ref 22–51)
LYMPHOCYTES # BLD AUTO: 0.46 K/UL — LOW (ref 1–3.3)
LYMPHOCYTES # BLD MANUAL: 0.26 K/UL — LOW (ref 1–3.3)
LYMPHOCYTES NFR BLD AUTO: 7.6 % — LOW (ref 13–44)
LYMPHOCYTES NFR BLD MANUAL: 4.3 % — LOW (ref 13–44)
MANUAL NEUTROPHIL BANDS #: 0.1 K/UL — SIGNIFICANT CHANGE UP (ref 0–0.84)
MANUAL REACTIVE LYMPHOCYTES #: 0.05 K/UL — SIGNIFICANT CHANGE UP (ref 0–0.63)
MCHC RBC-ENTMCNC: 28.8 PG — SIGNIFICANT CHANGE UP (ref 27–34)
MCHC RBC-ENTMCNC: 33.9 G/DL — SIGNIFICANT CHANGE UP (ref 32–36)
MCV RBC AUTO: 84.9 FL — SIGNIFICANT CHANGE UP (ref 80–100)
MONOCYTES # BLD AUTO: 0.32 K/UL — SIGNIFICANT CHANGE UP (ref 0–0.9)
MONOCYTES # BLD MANUAL: 0.37 K/UL — SIGNIFICANT CHANGE UP (ref 0–0.9)
MONOCYTES NFR BLD AUTO: 5.3 % — SIGNIFICANT CHANGE UP (ref 2–14)
MONOCYTES NFR BLD MANUAL: 6.1 % — SIGNIFICANT CHANGE UP (ref 2–14)
NEUTROPHILS # BLD AUTO: 5.23 K/UL — SIGNIFICANT CHANGE UP (ref 1.8–7.4)
NEUTROPHILS # BLD MANUAL: 5.3 K/UL — SIGNIFICANT CHANGE UP (ref 1.8–7.4)
NEUTROPHILS NFR BLD AUTO: 85.8 % — HIGH (ref 43–77)
NEUTROPHILS NFR BLD MANUAL: 87 % — HIGH (ref 43–77)
NEUTS BAND # BLD: 1.7 % — SIGNIFICANT CHANGE UP (ref 0–8)
NEUTS BAND NFR BLD: 1.7 % — SIGNIFICANT CHANGE UP (ref 0–8)
NRBC # BLD AUTO: 0 K/UL — SIGNIFICANT CHANGE UP (ref 0–0)
NRBC # FLD: 0 K/UL — SIGNIFICANT CHANGE UP (ref 0–0)
NRBC BLD AUTO-RTO: 0 /100 WBCS — SIGNIFICANT CHANGE UP (ref 0–0)
PLAT MORPH BLD: NORMAL — SIGNIFICANT CHANGE UP
PLATELET # BLD AUTO: 263 K/UL — SIGNIFICANT CHANGE UP (ref 150–400)
PMV BLD: 9.2 FL — SIGNIFICANT CHANGE UP (ref 7–13)
POIKILOCYTOSIS BLD QL AUTO: SLIGHT — SIGNIFICANT CHANGE UP
POLYCHROMASIA BLD QL SMEAR: SLIGHT — SIGNIFICANT CHANGE UP
POTASSIUM SERPL-MCNC: 4.6 MMOL/L — SIGNIFICANT CHANGE UP (ref 3.5–5.3)
POTASSIUM SERPL-SCNC: 4.6 MMOL/L — SIGNIFICANT CHANGE UP (ref 3.5–5.3)
PROT SERPL-MCNC: 6.8 G/DL — SIGNIFICANT CHANGE UP (ref 6.6–8.7)
RBC # BLD: 4.65 M/UL — SIGNIFICANT CHANGE UP (ref 3.8–5.2)
RBC # FLD: 13.6 % — SIGNIFICANT CHANGE UP (ref 10.3–14.5)
RBC BLD AUTO: ABNORMAL
SODIUM SERPL-SCNC: 135 MMOL/L — SIGNIFICANT CHANGE UP (ref 135–145)
VARIANT LYMPHS # BLD: 0.9 % — SIGNIFICANT CHANGE UP (ref 0–6)
VARIANT LYMPHS NFR BLD MANUAL: 0.9 % — SIGNIFICANT CHANGE UP (ref 0–6)
WBC # BLD: 6.09 K/UL — SIGNIFICANT CHANGE UP (ref 3.8–10.5)
WBC # FLD AUTO: 6.09 K/UL — SIGNIFICANT CHANGE UP (ref 3.8–10.5)

## 2025-03-29 PROCEDURE — 99284 EMERGENCY DEPT VISIT MOD MDM: CPT | Mod: 25

## 2025-03-29 PROCEDURE — 80053 COMPREHEN METABOLIC PANEL: CPT

## 2025-03-29 PROCEDURE — 83690 ASSAY OF LIPASE: CPT

## 2025-03-29 PROCEDURE — 99285 EMERGENCY DEPT VISIT HI MDM: CPT

## 2025-03-29 PROCEDURE — 96374 THER/PROPH/DIAG INJ IV PUSH: CPT | Mod: XU

## 2025-03-29 PROCEDURE — 96375 TX/PRO/DX INJ NEW DRUG ADDON: CPT

## 2025-03-29 PROCEDURE — 74177 CT ABD & PELVIS W/CONTRAST: CPT | Mod: MC

## 2025-03-29 PROCEDURE — 74177 CT ABD & PELVIS W/CONTRAST: CPT | Mod: 26

## 2025-03-29 PROCEDURE — 85025 COMPLETE CBC W/AUTO DIFF WBC: CPT

## 2025-03-29 PROCEDURE — 36415 COLL VENOUS BLD VENIPUNCTURE: CPT

## 2025-03-29 RX ORDER — ONDANSETRON HCL/PF 4 MG/2 ML
4 VIAL (ML) INJECTION ONCE
Refills: 0 | Status: COMPLETED | OUTPATIENT
Start: 2025-03-29 | End: 2025-03-29

## 2025-03-29 RX ORDER — ACETAMINOPHEN 500 MG/5ML
650 LIQUID (ML) ORAL ONCE
Refills: 0 | Status: COMPLETED | OUTPATIENT
Start: 2025-03-29 | End: 2025-03-29

## 2025-03-29 RX ADMIN — Medication 4 MILLIGRAM(S): at 17:07

## 2025-03-29 RX ADMIN — Medication 1000 MILLILITER(S): at 20:04

## 2025-03-29 RX ADMIN — Medication 1000 MILLILITER(S): at 17:07

## 2025-03-29 RX ADMIN — Medication 260 MILLIGRAM(S): at 18:00

## 2025-05-14 ENCOUNTER — APPOINTMENT (OUTPATIENT)
Dept: GASTROENTEROLOGY | Facility: CLINIC | Age: 80
End: 2025-05-14

## 2025-05-14 PROBLEM — K57.92 DIVERTICULITIS OF INTESTINE, PART UNSPECIFIED, WITHOUT PERFORATION OR ABSCESS WITHOUT BLEEDING: Chronic | Status: ACTIVE | Noted: 2025-03-29

## 2025-07-02 ENCOUNTER — APPOINTMENT (OUTPATIENT)
Dept: GASTROENTEROLOGY | Facility: CLINIC | Age: 80
End: 2025-07-02
Payer: MEDICARE

## 2025-07-02 VITALS
RESPIRATION RATE: 16 BRPM | SYSTOLIC BLOOD PRESSURE: 110 MMHG | WEIGHT: 109 LBS | HEART RATE: 70 BPM | BODY MASS INDEX: 20.06 KG/M2 | DIASTOLIC BLOOD PRESSURE: 65 MMHG | OXYGEN SATURATION: 98 % | HEIGHT: 62 IN

## 2025-07-02 PROBLEM — I35.1 MODERATE AORTIC INSUFFICIENCY: Status: ACTIVE | Noted: 2025-07-02

## 2025-07-02 PROBLEM — Z12.11 COLON CANCER SCREENING: Status: ACTIVE | Noted: 2025-07-02

## 2025-07-02 PROCEDURE — 99203 OFFICE O/P NEW LOW 30 MIN: CPT

## 2025-07-02 PROCEDURE — G2211 COMPLEX E/M VISIT ADD ON: CPT

## 2025-07-11 ENCOUNTER — APPOINTMENT (OUTPATIENT)
Dept: CARDIOLOGY | Facility: CLINIC | Age: 80
End: 2025-07-11

## 2025-07-11 VITALS
HEART RATE: 77 BPM | WEIGHT: 108 LBS | BODY MASS INDEX: 21.2 KG/M2 | DIASTOLIC BLOOD PRESSURE: 65 MMHG | SYSTOLIC BLOOD PRESSURE: 130 MMHG | OXYGEN SATURATION: 96 % | HEIGHT: 60 IN

## 2025-07-11 PROCEDURE — 99214 OFFICE O/P EST MOD 30 MIN: CPT

## 2025-07-11 PROCEDURE — G0446: CPT | Mod: 59

## 2025-07-11 PROCEDURE — 93000 ELECTROCARDIOGRAM COMPLETE: CPT

## 2025-07-11 PROCEDURE — G2211 COMPLEX E/M VISIT ADD ON: CPT

## 2025-07-17 PROBLEM — Z13.6 ENCOUNTER FOR SCREENING FOR CARDIOVASCULAR DISORDERS: Status: ACTIVE | Noted: 2025-07-17
